# Patient Record
Sex: MALE | Race: WHITE | Employment: FULL TIME | URBAN - NONMETROPOLITAN AREA
[De-identification: names, ages, dates, MRNs, and addresses within clinical notes are randomized per-mention and may not be internally consistent; named-entity substitution may affect disease eponyms.]

---

## 2017-01-31 ENCOUNTER — TELEPHONE (OUTPATIENT)
Dept: FAMILY MEDICINE CLINIC | Age: 52
End: 2017-01-31

## 2017-05-15 ENCOUNTER — TELEPHONE (OUTPATIENT)
Dept: FAMILY MEDICINE CLINIC | Age: 52
End: 2017-05-15

## 2017-05-15 DIAGNOSIS — Z86.39 HISTORY OF HYPOTHYROIDISM: Chronic | ICD-10-CM

## 2017-05-15 DIAGNOSIS — R73.03 PREDIABETES: ICD-10-CM

## 2017-05-15 DIAGNOSIS — R73.9 HYPERGLYCEMIA: ICD-10-CM

## 2017-05-15 DIAGNOSIS — E66.9 OBESITY (BMI 30-39.9): Primary | Chronic | ICD-10-CM

## 2017-06-05 ENCOUNTER — TELEPHONE (OUTPATIENT)
Dept: UROLOGY | Age: 52
End: 2017-06-05

## 2017-06-05 ENCOUNTER — OFFICE VISIT (OUTPATIENT)
Dept: UROLOGY | Age: 52
End: 2017-06-05

## 2017-06-05 VITALS
WEIGHT: 285 LBS | SYSTOLIC BLOOD PRESSURE: 110 MMHG | HEIGHT: 73 IN | DIASTOLIC BLOOD PRESSURE: 68 MMHG | BODY MASS INDEX: 37.77 KG/M2

## 2017-06-05 DIAGNOSIS — N20.0 KIDNEY STONE: Primary | ICD-10-CM

## 2017-06-05 LAB
BILIRUBIN URINE: NEGATIVE
BLOOD URINE, POC: ABNORMAL
CHARACTER, URINE: CLEAR
COLOR, URINE: ABNORMAL
GLUCOSE URINE: NEGATIVE MG/DL
KETONES, URINE: NEGATIVE
LEUKOCYTE CLUMPS, URINE: NEGATIVE
NITRITE, URINE: NEGATIVE
PH, URINE: 5.5
PROTEIN, URINE: 30 MG/DL
SPECIFIC GRAVITY, URINE: 1.01 (ref 1–1.03)
UROBILINOGEN, URINE: 0.2 EU/DL

## 2017-06-05 PROCEDURE — 81003 URINALYSIS AUTO W/O SCOPE: CPT | Performed by: NURSE PRACTITIONER

## 2017-06-05 PROCEDURE — 99204 OFFICE O/P NEW MOD 45 MIN: CPT | Performed by: NURSE PRACTITIONER

## 2017-06-05 RX ORDER — KETOROLAC TROMETHAMINE 10 MG/1
10 TABLET, FILM COATED ORAL EVERY 6 HOURS PRN
Qty: 20 TABLET | Refills: 0 | Status: SHIPPED | OUTPATIENT
Start: 2017-06-05 | End: 2017-06-16 | Stop reason: SDUPTHER

## 2017-06-06 ENCOUNTER — TELEPHONE (OUTPATIENT)
Dept: UROLOGY | Age: 52
End: 2017-06-06

## 2017-06-08 ENCOUNTER — TELEPHONE (OUTPATIENT)
Dept: FAMILY MEDICINE CLINIC | Age: 52
End: 2017-06-08

## 2017-06-09 ENCOUNTER — TELEPHONE (OUTPATIENT)
Dept: UROLOGY | Age: 52
End: 2017-06-09

## 2017-06-10 ENCOUNTER — NURSE TRIAGE (OUTPATIENT)
Dept: ADMINISTRATIVE | Age: 52
End: 2017-06-10

## 2017-06-13 ENCOUNTER — TELEPHONE (OUTPATIENT)
Dept: UROLOGY | Age: 52
End: 2017-06-13

## 2017-06-13 RX ORDER — HYDROCODONE BITARTRATE AND ACETAMINOPHEN 5; 325 MG/1; MG/1
1 TABLET ORAL EVERY 4 HOURS PRN
Qty: 10 TABLET | Refills: 0 | Status: SHIPPED | OUTPATIENT
Start: 2017-06-13 | End: 2017-06-20

## 2017-06-15 ENCOUNTER — TELEPHONE (OUTPATIENT)
Dept: UROLOGY | Age: 52
End: 2017-06-15

## 2017-06-16 RX ORDER — KETOROLAC TROMETHAMINE 10 MG/1
10 TABLET, FILM COATED ORAL EVERY 6 HOURS PRN
Qty: 20 TABLET | Refills: 0 | Status: SHIPPED | OUTPATIENT
Start: 2017-06-16 | End: 2020-01-26

## 2017-06-19 ENCOUNTER — PROCEDURE VISIT (OUTPATIENT)
Dept: UROLOGY | Age: 52
End: 2017-06-19

## 2017-06-19 ENCOUNTER — TELEPHONE (OUTPATIENT)
Dept: UROLOGY | Age: 52
End: 2017-06-19

## 2017-06-19 VITALS
WEIGHT: 282 LBS | DIASTOLIC BLOOD PRESSURE: 82 MMHG | HEIGHT: 73 IN | SYSTOLIC BLOOD PRESSURE: 126 MMHG | BODY MASS INDEX: 37.37 KG/M2

## 2017-06-19 DIAGNOSIS — N20.0 RENAL STONE: ICD-10-CM

## 2017-06-19 DIAGNOSIS — N20.1 URETERAL STONE: Primary | ICD-10-CM

## 2017-06-19 LAB
BILIRUBIN URINE: NEGATIVE
BLOOD URINE, POC: ABNORMAL
CHARACTER, URINE: ABNORMAL
COLOR, URINE: ABNORMAL
GLUCOSE URINE: NEGATIVE MG/DL
KETONES, URINE: NEGATIVE
LEUKOCYTE CLUMPS, URINE: ABNORMAL
NITRITE, URINE: NEGATIVE
PH, URINE: 5.5
PROTEIN, URINE: 100 MG/DL
SPECIFIC GRAVITY, URINE: >= 1.03 (ref 1–1.03)
UROBILINOGEN, URINE: 0.2 EU/DL

## 2017-06-19 PROCEDURE — 52310 CYSTOSCOPY AND TREATMENT: CPT | Performed by: UROLOGY

## 2017-06-19 PROCEDURE — 81003 URINALYSIS AUTO W/O SCOPE: CPT | Performed by: UROLOGY

## 2017-06-19 PROCEDURE — 99999 PR OFFICE/OUTPT VISIT,PROCEDURE ONLY: CPT | Performed by: UROLOGY

## 2017-06-19 RX ORDER — OXYBUTYNIN CHLORIDE 5 MG/1
5 TABLET ORAL 3 TIMES DAILY PRN
Qty: 30 TABLET | Refills: 0 | Status: SHIPPED | OUTPATIENT
Start: 2017-06-19 | End: 2020-01-26

## 2017-08-10 LAB
ALBUMIN SERPL-MCNC: 4.7 G/DL (ref 3.2–5.3)
ALK PHOSPHATASE: 71 IU/L (ref 35–121)
ALT SERPL-CCNC: 22 IU/L (ref 5–59)
ANION GAP SERPL CALCULATED.3IONS-SCNC: 15 MMOL/L
AST SERPL-CCNC: 15 IU/L (ref 10–42)
AVERAGE GLUCOSE: 111 MG/DL (ref 66–114)
BILIRUB SERPL-MCNC: 0.6 MG/DL (ref 0.2–1.3)
BUN BLDV-MCNC: 12 MG/DL (ref 10–20)
CALCIUM SERPL-MCNC: 9.9 MG/DL (ref 8.7–10.8)
CHLORIDE BLD-SCNC: 103 MMOL/L (ref 95–111)
CHOLESTEROL/HDL RATIO: 6.3
CHOLESTEROL: 292 MG/DL
CO2: 26 MMOL/L (ref 21–32)
CREAT SERPL-MCNC: 1 MG/DL (ref 0.5–1.3)
EGFR AFRICAN AMERICAN: 96
EGFR IF NONAFRICAN AMERICAN: 79
GLUCOSE: 110 MG/DL (ref 70–100)
HBA1C MFR BLD: 5.5 % (ref 4.2–5.8)
HDLC SERPL-MCNC: 46 MG/DL (ref 40–60)
LDL CHOLESTEROL CALCULATED: 197 MG/DL
LDL/HDL RATIO: 4.3
POTASSIUM SERPL-SCNC: 4.5 MMOL/L (ref 3.5–5.4)
SODIUM BLD-SCNC: 139 MMOL/L (ref 134–147)
TOTAL PROTEIN: 7.1 G/DL (ref 5.8–8)
TRIGL SERPL-MCNC: 247 MG/DL
TSH SERPL DL<=0.05 MIU/L-ACNC: 1.25 UIU/ML (ref 0.4–4.4)
VLDLC SERPL CALC-MCNC: 49 MG/DL

## 2017-08-11 ENCOUNTER — TELEPHONE (OUTPATIENT)
Dept: FAMILY MEDICINE CLINIC | Age: 52
End: 2017-08-11

## 2017-08-16 ENCOUNTER — TELEPHONE (OUTPATIENT)
Dept: FAMILY MEDICINE CLINIC | Age: 52
End: 2017-08-16

## 2017-08-16 DIAGNOSIS — E78.2 MIXED HYPERLIPIDEMIA: Primary | Chronic | ICD-10-CM

## 2017-08-16 RX ORDER — EZETIMIBE AND SIMVASTATIN 10; 40 MG/1; MG/1
1 TABLET ORAL DAILY
COMMUNITY

## 2017-10-23 ENCOUNTER — TELEPHONE (OUTPATIENT)
Dept: FAMILY MEDICINE CLINIC | Age: 52
End: 2017-10-23

## 2017-10-23 NOTE — TELEPHONE ENCOUNTER
2nd attempt to contact the pt re:overdue labs Dr Saud Heller ordered on 8/16/17. HIPAA form is up to date, order mailed.

## 2020-01-26 ENCOUNTER — HOSPITAL ENCOUNTER (EMERGENCY)
Age: 55
Discharge: HOME | End: 2020-01-26
Attending: EMERGENCY MEDICINE
Payer: COMMERCIAL

## 2020-01-26 VITALS
HEART RATE: 88 BPM | HEIGHT: 72 IN | DIASTOLIC BLOOD PRESSURE: 78 MMHG | TEMPERATURE: 98 F | SYSTOLIC BLOOD PRESSURE: 100 MMHG | BODY MASS INDEX: 42.66 KG/M2 | RESPIRATION RATE: 18 BRPM | OXYGEN SATURATION: 99 % | WEIGHT: 315 LBS

## 2020-01-26 LAB
ALBUMIN SERPL-MCNC: 4.4 G/DL (ref 3.5–5.1)
ALP BLD-CCNC: 76 U/L (ref 38–126)
ALT SERPL-CCNC: 16 U/L (ref 11–66)
ANION GAP SERPL CALCULATED.3IONS-SCNC: 13 MEQ/L (ref 8–16)
AST SERPL-CCNC: 16 U/L (ref 5–40)
BASOPHILS # BLD: 0.7 %
BASOPHILS ABSOLUTE: 0.1 THOU/MM3 (ref 0–0.1)
BILIRUB SERPL-MCNC: 0.2 MG/DL (ref 0.3–1.2)
BUN BLDV-MCNC: 15 MG/DL (ref 7–22)
CALCIUM SERPL-MCNC: 9.3 MG/DL (ref 8.5–10.5)
CHLORIDE BLD-SCNC: 106 MEQ/L (ref 98–111)
CO2: 22 MEQ/L (ref 23–33)
CREAT SERPL-MCNC: 1 MG/DL (ref 0.4–1.2)
EKG ATRIAL RATE: 59 BPM
EKG P AXIS: 40 DEGREES
EKG P-R INTERVAL: 160 MS
EKG Q-T INTERVAL: 400 MS
EKG QRS DURATION: 104 MS
EKG QTC CALCULATION (BAZETT): 396 MS
EKG R AXIS: 25 DEGREES
EKG T AXIS: 31 DEGREES
EKG VENTRICULAR RATE: 59 BPM
EOSINOPHIL # BLD: 3 %
EOSINOPHILS ABSOLUTE: 0.3 THOU/MM3 (ref 0–0.4)
ERYTHROCYTE [DISTWIDTH] IN BLOOD BY AUTOMATED COUNT: 14.1 % (ref 11.5–14.5)
ERYTHROCYTE [DISTWIDTH] IN BLOOD BY AUTOMATED COUNT: 46.8 FL (ref 35–45)
GFR SERPL CREATININE-BSD FRML MDRD: 78 ML/MIN/1.73M2
GLUCOSE BLD-MCNC: 143 MG/DL (ref 70–108)
HCT VFR BLD CALC: 46.9 % (ref 42–52)
HEMOGLOBIN: 15.2 GM/DL (ref 14–18)
IMMATURE GRANS (ABS): 0.03 THOU/MM3 (ref 0–0.07)
IMMATURE GRANULOCYTES: 0.4 %
LYMPHOCYTES # BLD: 16.2 %
LYMPHOCYTES ABSOLUTE: 1.4 THOU/MM3 (ref 1–4.8)
MCH RBC QN AUTO: 29.5 PG (ref 26–33)
MCHC RBC AUTO-ENTMCNC: 32.4 GM/DL (ref 32.2–35.5)
MCV RBC AUTO: 90.9 FL (ref 80–94)
MONOCYTES # BLD: 6.4 %
MONOCYTES ABSOLUTE: 0.5 THOU/MM3 (ref 0.4–1.3)
NUCLEATED RED BLOOD CELLS: 0 /100 WBC
OSMOLALITY CALCULATION: 284.6 MOSMOL/KG (ref 275–300)
PLATELET # BLD: 151 THOU/MM3 (ref 130–400)
PMV BLD AUTO: 12.4 FL (ref 9.4–12.4)
POTASSIUM SERPL-SCNC: 4.1 MEQ/L (ref 3.5–5.2)
RBC # BLD: 5.16 MILL/MM3 (ref 4.7–6.1)
SCAN OF BLOOD SMEAR: NORMAL
SEG NEUTROPHILS: 73.3 %
SEGMENTED NEUTROPHILS ABSOLUTE COUNT: 6.2 THOU/MM3 (ref 1.8–7.7)
SODIUM BLD-SCNC: 141 MEQ/L (ref 135–145)
TOTAL PROTEIN: 7.2 G/DL (ref 6.1–8)
TROPONIN T: < 0.01 NG/ML
WBC # BLD: 8.4 THOU/MM3 (ref 4.8–10.8)

## 2020-01-26 PROCEDURE — 80053 COMPREHEN METABOLIC PANEL: CPT

## 2020-01-26 PROCEDURE — 36415 COLL VENOUS BLD VENIPUNCTURE: CPT

## 2020-01-26 PROCEDURE — 84484 ASSAY OF TROPONIN QUANT: CPT

## 2020-01-26 PROCEDURE — 2709999900 HC NON-CHARGEABLE SUPPLY

## 2020-01-26 PROCEDURE — 93005 ELECTROCARDIOGRAM TRACING: CPT | Performed by: EMERGENCY MEDICINE

## 2020-01-26 PROCEDURE — 99284 EMERGENCY DEPT VISIT MOD MDM: CPT

## 2020-01-26 PROCEDURE — 85025 COMPLETE CBC W/AUTO DIFF WBC: CPT

## 2020-01-26 RX ORDER — GUAIFENESIN AND CODEINE PHOSPHATE 100; 10 MG/5ML; MG/5ML
5 SOLUTION ORAL 3 TIMES DAILY PRN
Qty: 45 ML | Refills: 0 | Status: SHIPPED | OUTPATIENT
Start: 2020-01-26 | End: 2020-01-29

## 2020-01-26 ASSESSMENT — PAIN DESCRIPTION - ORIENTATION: ORIENTATION: LOWER

## 2020-01-26 ASSESSMENT — PAIN SCALES - GENERAL: PAINLEVEL_OUTOF10: 9

## 2020-01-26 ASSESSMENT — PAIN DESCRIPTION - LOCATION: LOCATION: ABDOMEN

## 2020-01-27 ASSESSMENT — ENCOUNTER SYMPTOMS
ABDOMINAL PAIN: 1
ABDOMINAL DISTENTION: 0
WHEEZING: 0
CONSTIPATION: 0
DIARRHEA: 0
SORE THROAT: 0
BACK PAIN: 0
EYE PAIN: 0
EYE DISCHARGE: 0
COUGH: 0
PHOTOPHOBIA: 0
VOMITING: 0
STRIDOR: 0
EYE REDNESS: 0
CHEST TIGHTNESS: 0
SHORTNESS OF BREATH: 0
NAUSEA: 0
EYE ITCHING: 0
RHINORRHEA: 0

## 2020-01-27 NOTE — ED TRIAGE NOTES
Pt comes in by private car. He began having intense lower abdominal pain about 2 hours ago. He states he is also nauseous but has not vomited. He has history of kidney stones but nothing like this. He is diaphoretic and states he feels like he is going to pass out.

## 2020-01-27 NOTE — ED PROVIDER NOTES
for environmental allergies and food allergies. Neurological: Negative for dizziness, tremors, syncope, weakness and headaches. Psychiatric/Behavioral: Negative for agitation, behavioral problems, confusion, self-injury, sleep disturbance and suicidal ideas. PAST MEDICAL HISTORY    has a past medical history of Depression, History of hypothyroidism, Hyperlipidemia, Obesity, and Obstructive sleep apnea. SURGICAL HISTORY      has a past surgical history that includes Anterior cruciate ligament repair (Right, 2004); Vasectomy (2012); and Cystocopy (Right, 06/07/2017). CURRENT MEDICATIONS       Discharge Medication List as of 1/26/2020  9:56 PM      CONTINUE these medications which have NOT CHANGED    Details   ezetimibe-simvastatin (VYTORIN) 10-40 MG per tablet Take 1 tablet by mouth nightlyHistorical Med             ALLERGIES     has No Known Allergies. FAMILY HISTORY     has no family status information on file. family history is not on file. SOCIAL HISTORY      reports that he has never smoked. He has never used smokeless tobacco. He reports current alcohol use. He reports that he does not use drugs. PHYSICAL EXAM     INITIAL VITALS:  height is 6' (1.829 m) and weight is 320 lb (145.2 kg) (abnormal). His oral temperature is 98 °F (36.7 °C). His blood pressure is 100/78 and his pulse is 88. His respiration is 18 and oxygen saturation is 99%. Physical Exam  Vitals signs and nursing note reviewed. Constitutional:       Appearance: He is well-developed. He is not diaphoretic. HENT:      Head: Normocephalic and atraumatic. Nose: Nose normal.   Eyes:      General: No scleral icterus. Right eye: No discharge. Left eye: No discharge. Conjunctiva/sclera: Conjunctivae normal.      Pupils: Pupils are equal, round, and reactive to light. Neck:      Musculoskeletal: Normal range of motion and neck supple. Vascular: No JVD. Trachea: No tracheal deviation. ms  QRS duration 104 ms   ms  Incomplete RBBB  No ST elevation or QT wave    RADIOLOGY: non-plain film images(s) such as CT, Ultrasound and MRI are read by the radiologist.    No orders to display       []Visualized and interpreted by me   [] Radiologist's Wet Read Report Reviewed   [] Discussed with Radiologist.    LABS:   Results for orders placed or performed during the hospital encounter of 01/26/20   CBC auto differential   Result Value Ref Range    WBC 8.4 4.8 - 10.8 thou/mm3    RBC 5.16 4.70 - 6.10 mill/mm3    Hemoglobin 15.2 14.0 - 18.0 gm/dl    Hematocrit 46.9 42.0 - 52.0 %    MCV 90.9 80.0 - 94.0 fL    MCH 29.5 26.0 - 33.0 pg    MCHC 32.4 32.2 - 35.5 gm/dl    RDW-CV 14.1 11.5 - 14.5 %    RDW-SD 46.8 (H) 35.0 - 45.0 fL    Platelets 660 565 - 109 thou/mm3    MPV 12.4 9.4 - 12.4 fL    Seg Neutrophils 73.3 %    Lymphocytes 16.2 %    Monocytes 6.4 %    Eosinophils 3.0 %    Basophils 0.7 %    Immature Granulocytes 0.4 %    Segs Absolute 6.2 1.8 - 7.7 thou/mm3    Lymphocytes Absolute 1.4 1.0 - 4.8 thou/mm3    Monocytes Absolute 0.5 0.4 - 1.3 thou/mm3    Eosinophils Absolute 0.3 0.0 - 0.4 thou/mm3    Basophils Absolute 0.1 0.0 - 0.1 thou/mm3    Immature Grans (Abs) 0.03 0.00 - 0.07 thou/mm3    nRBC 0 /100 wbc   Comprehensive Metabolic Panel   Result Value Ref Range    Glucose 143 (H) 70 - 108 mg/dL    CREATININE 1.0 0.4 - 1.2 mg/dL    BUN 15 7 - 22 mg/dL    Sodium 141 135 - 145 meq/L    Potassium 4.1 3.5 - 5.2 meq/L    Chloride 106 98 - 111 meq/L    CO2 22 (L) 23 - 33 meq/L    Calcium 9.3 8.5 - 10.5 mg/dL    AST 16 5 - 40 U/L    Alkaline Phosphatase 76 38 - 126 U/L    Total Protein 7.2 6.1 - 8.0 g/dL    Alb 4.4 3.5 - 5.1 g/dL    Total Bilirubin 0.2 (L) 0.3 - 1.2 mg/dL    ALT 16 11 - 66 U/L   Troponin   Result Value Ref Range    Troponin T < 0.010 ng/ml   Anion Gap   Result Value Ref Range    Anion Gap 13.0 8.0 - 16.0 meq/L   Glomerular Filtration Rate, Estimated   Result Value Ref Range    Est, Glom Filt Rate 78 (A) ml/min/1.73m2   Osmolality   Result Value Ref Range    Osmolality Calc 284.6 275.0 - 300 mOsmol/kg   Scan of Blood Smear   Result Value Ref Range    SCAN OF BLOOD SMEAR see below    EKG Syncope   Result Value Ref Range    Ventricular Rate 59 BPM    Atrial Rate 59 BPM    P-R Interval 160 ms    QRS Duration 104 ms    Q-T Interval 400 ms    QTc Calculation (Bazett) 396 ms    P Axis 40 degrees    R Axis 25 degrees    T Axis 31 degrees       EMERGENCY DEPARTMENT COURSE:   Vitals:    Vitals:    01/26/20 2046 01/26/20 2147   BP: 126/74 100/78   Pulse: 75 88   Resp: 18 18   Temp: 98 °F (36.7 °C)    TempSrc: Oral    SpO2: 98% 99%   Weight: (!) 320 lb (145.2 kg)    Height: 6' (1.829 m)        21:45    Patient is seen and evaluated in a timely fashion. Action:     STAT umbilical hernia reduction    MedicalDecision Making    Reassessment: feeling better. Umbilical hernia was reduced without difficulty, after reduction, patient has complete normal abdominal exam including no tenderness, no guarding, no rebound pain, patient has normal active bowel sounds, patient has no nausea vomiting. No suspicion patient has umbilical hernia strangulation or persistent incarceration causing SBO. Patient discharged with general surgery follow-up in 1 week. He has been having chronic cough in the last 3 months, I do feel a course of strong cough syrup is indicated with his umbilical hernia. He is given prescription of guaifenesin with codeine. CRITICAL CARE:   None    CONSULTS:  None    PROCEDURES:  None    FINAL IMPRESSION      1. Umbilical hernia without obstruction and without gangrene    2.  Viral URI with cough          DISPOSITION/PLAN   Home    PATIENT REFERRED TO:  MD Chris Swan [address]  [address]  [phone]    In 1 week  ED visit follow-up for umbilical hernia      DISCHARGE MEDICATIONS:  Discharge Medication List as of 1/26/2020  9:56 PM      START taking these medications    Details   guaiFENesin-codeine (TUSSI-ORGANIDIN NR) 100-10 MG/5ML syrup Take 5 mLs by mouth 3 times daily as needed for Cough for up to 3 days. , Disp-45 mL, R-0Print             (Please note that portions of this note were completed with a voice recognition program.  Efforts were made to edit the dictations but occasionally words aremis-transcribed.)    MD Ayala Arteaga MD  01/27/20 6249

## 2020-02-10 NOTE — PROGRESS NOTES
Maksim Boyce, [address] [place] [address]  [phone]  New Patient Evaluation in Office    Pt Name: Jono Lezama  Date of Birth 1965   Today's Date: 2/11/2020  Medical Record Number: [id number]  Referring Provider: No ref. provider found  Primary Care Provider: Abbey Givens MD  Chief Complaint   Patient presents with    Surgical Consult     new patient- [facility] ED 1/26-umbilical hernia     ASSESSMENT       Diagnosis Orders   1. Umbilical hernia without obstruction and without gangrene     2. Inguinal hernia, left       Past Medical History:   Diagnosis Date    Depression     History of hypothyroidism     no meds    Hyperlipidemia     Obesity 4/2/2014    Obstructive sleep apnea 02/13/2015    cpap    Umbilical hernia 4213          PLANS      1. Schedule Meagan Luevano for robotic left inguinal umbilical hernia repair possible mesh  2. The risks, options and alternatives for treatment were discussed in the office. We also discussed the use of mesh. Complications for the procedure were discussed including but not limited to infection, bleeding, recurrence, reoperation, injury to surrounding structures or organs. The patient questions were answered and has decided to proceed with hernia repair. 3. Status: outpatient  4. Planned anesthesia: general  5. He will undergo pre-operative clearance per anesthesia guidelines with risk factors listed under the past medical history diagnosis & problem list.     Trudy Bangura is a 50 y.o. male seen in the consultation for evaluation of an umbilical hernia. Symptoms were first noted several years ago and have gradually worsened since that time. The pain is dull, intermittent. Symptoms are made worse with Valsalva maneuvers and palliated by rest. The mass is not reducible. The patient denies any symptoms of signs or symptoms of incarceration or strangulation\".  He has not had prior Systems  Constitutional: Negative for activity change, appetite change, chills, diaphoresis, fatigue, fever and unexpected weight change. HENT: Negative for congestion, dental problem, drooling, ear discharge, ear pain, facial swelling, hearing loss, mouth sores, nosebleeds, postnasal drip, rhinorrhea, sinus pressure, sneezing, sore throat, tinnitus, trouble swallowing and voice change. Eyes: Negative for photophobia, pain, discharge, redness, itching and visual disturbance. Respiratory: Negative for apnea, cough, choking, chest tightness, shortness of breath, wheezing and stridor. Cardiovascular: Negative for chest pain, palpitations and leg swelling. Gastrointestinal: Negative for abdominal distention, abdominal pain, anal bleeding, blood in stool, constipation, diarrhea, nausea, rectal pain and vomiting. Endocrine: Negative for cold intolerance, heat intolerance, polydipsia, polyphagia and polyuria. Genitourinary: Negative for decreased urine volume, difficulty urinating, dysuria, enuresis, flank pain, frequency, genital sores, hematuria and urgency. Musculoskeletal: Negative for arthralgias, back pain, gait problem, joint swelling, myalgias, neck pain and neck stiffness. Umbilical hernia   Skin: Negative for color change, pallor, rash and wound. Allergic/Immunologic: Negative for environmental allergies, food allergies and immunocompromised state. Neurological: Negative for dizziness, tremors, seizures, syncope, facial asymmetry, speech difficulty, weakness, light-headedness, numbness and headaches. Hematological: Negative for adenopathy. Does not bruise/bleed easily. Psychiatric/Behavioral: Negative for agitation, behavioral problems, confusion, decreased concentration, dysphoric mood, hallucinations, self-injury, sleep disturbance and suicidal ideas. The patient is not nervous/anxious and is not hyperactive.     OBJECTIVE    VITALS:  height is 6' 1\" (1.854 m) and weight is 331 lb (150.1 kg) (abnormal). His temporal temperature is 96 °F (35.6 °C). His blood pressure is 138/62 and his pulse is 61. His respiration is 18 and oxygen saturation is 96%. Pain Score:   0 - No pain Body mass index is 43.67 kg/m². CONSTITUTIONAL: Alert and oriented times 3, no acute distress and cooperative to examination with proper mood and affect. SKIN: Skin color, texture, turgor normal. No rashes or lesions. LYMPH: no cervical nodes, no inguinal nodes  HEENT: Head is normocephalic, atraumatic. EOMI, PERRLA. NECK: Supple, symmetrical, trachea midline, no adenopathy, thyroid symmetric, not enlarged and no tenderness, skin normal.  CHEST/LUNGS: chest symmetric with normal A/P diameter, normal respiratory rate and rhythm, lungs clear to auscultation without wheezes, rales or rhonchi. No accessory muscle use. Scars None   CARDIOVASCULAR: Heart sounds are normal.  Regular rate and rhythm without murmur, gallop or rub. Normal S1 and S2. Carotid and femoral pulses 2+/4 and equal bilaterally. ABDOMEN: obese No scar(s) present. Normal bowel sounds. No bruits. soft, nontender, nondistended, no masses or organomegaly. direct umbilical hernia is present which is not reducible. Left inguinal hernia present and is reducible. Percussion: Normal without hepatosplenomegally. RECTAL: deferred, not clinically indicated  NEUROLOGIC: There are no focalizing motor or sensory deficits. CN II-XII are grossly intact. Darlean Cork EXTREMITIES: no cyanosis, no clubbing and no edema. Thank you for the interesting evaluation. Further recommendations as listed above.        Electronically signed by Levon Hill DO on 2/11/2020 at 1:55 PM

## 2020-02-11 ENCOUNTER — OFFICE VISIT (OUTPATIENT)
Dept: SURGERY | Age: 55
End: 2020-02-11
Payer: COMMERCIAL

## 2020-02-11 VITALS
DIASTOLIC BLOOD PRESSURE: 62 MMHG | HEART RATE: 61 BPM | TEMPERATURE: 96 F | SYSTOLIC BLOOD PRESSURE: 138 MMHG | OXYGEN SATURATION: 96 % | RESPIRATION RATE: 18 BRPM | WEIGHT: 315 LBS | BODY MASS INDEX: 41.75 KG/M2 | HEIGHT: 73 IN

## 2020-02-11 PROCEDURE — G8484 FLU IMMUNIZE NO ADMIN: HCPCS | Performed by: SURGERY

## 2020-02-11 PROCEDURE — G8417 CALC BMI ABV UP PARAM F/U: HCPCS | Performed by: SURGERY

## 2020-02-11 PROCEDURE — 99204 OFFICE O/P NEW MOD 45 MIN: CPT | Performed by: SURGERY

## 2020-02-11 PROCEDURE — G8427 DOCREV CUR MEDS BY ELIG CLIN: HCPCS | Performed by: SURGERY

## 2020-02-11 ASSESSMENT — ENCOUNTER SYMPTOMS
SINUS PRESSURE: 0
BLOOD IN STOOL: 0
CONSTIPATION: 0
BACK PAIN: 0
DIARRHEA: 0
ABDOMINAL DISTENTION: 0
STRIDOR: 0
EYE DISCHARGE: 0
EYE ITCHING: 0
RECTAL PAIN: 0
EYE REDNESS: 0
RHINORRHEA: 0
ANAL BLEEDING: 0
SHORTNESS OF BREATH: 0
FACIAL SWELLING: 0
VOICE CHANGE: 0
COLOR CHANGE: 0
PHOTOPHOBIA: 0
SORE THROAT: 0
ABDOMINAL PAIN: 0
CHOKING: 0
CHEST TIGHTNESS: 0
VOMITING: 0
COUGH: 0
NAUSEA: 0
EYE PAIN: 0
APNEA: 0
WHEEZING: 0
TROUBLE SWALLOWING: 0

## 2020-02-11 NOTE — PROGRESS NOTES
Subjective:      Patient ID: Jane Chin is a 50 y.o. male. Chief Complaint   Patient presents with    Surgical Consult     new patient- [address] [facility] ED 1/26-umbilical hernia       HPI    Review of Systems   Constitutional: Negative for activity change, appetite change, chills, diaphoresis, fatigue, fever and unexpected weight change. HENT: Negative for congestion, dental problem, drooling, ear discharge, ear pain, facial swelling, hearing loss, mouth sores, nosebleeds, postnasal drip, rhinorrhea, sinus pressure, sneezing, sore throat, tinnitus, trouble swallowing and voice change. Eyes: Negative for photophobia, pain, discharge, redness, itching and visual disturbance. Respiratory: Negative for apnea, cough, choking, chest tightness, shortness of breath, wheezing and stridor. Cardiovascular: Negative for chest pain, palpitations and leg swelling. Gastrointestinal: Negative for abdominal distention, abdominal pain, anal bleeding, blood in stool, constipation, diarrhea, nausea, rectal pain and vomiting. Endocrine: Negative for cold intolerance, heat intolerance, polydipsia, polyphagia and polyuria. Genitourinary: Negative for decreased urine volume, difficulty urinating, dysuria, enuresis, flank pain, frequency, genital sores, hematuria and urgency. Musculoskeletal: Negative for arthralgias, back pain, gait problem, joint swelling, myalgias, neck pain and neck stiffness. Umbilical hernia   Skin: Negative for color change, pallor, rash and wound. Allergic/Immunologic: Negative for environmental allergies, food allergies and immunocompromised state. Neurological: Negative for dizziness, tremors, seizures, syncope, facial asymmetry, speech difficulty, weakness, light-headedness, numbness and headaches. Hematological: Negative for adenopathy. Does not bruise/bleed easily.    Psychiatric/Behavioral: Negative for agitation, behavioral problems, confusion, decreased concentration, dysphoric mood, hallucinations, self-injury, sleep disturbance and suicidal ideas. The patient is not nervous/anxious and is not hyperactive.       Resp 18   Ht 6' 1\" (1.854 m)   Wt (!) 331 lb (150.1 kg)   BMI 43.67 kg/m²     Objective:   Physical Exam    Assessment:            Plan:              Rene Justice LPN

## 2020-02-11 NOTE — LETTER
Zenia Holter,   [address] [facility]. [address]  [place] [address]  [phone]     Pt Name: Sharona Olguin  Medical Record Number: [id number]  Date of Birth 1965   Today's Date: 2/11/2020    Susy Fung was seen in consultation in the office today. My assessment and plans are listed below. ASSESSMENT      Diagnosis Orders   1. Umbilical hernia without obstruction and without gangrene     2. Inguinal hernia, left       Past Medical History:   Diagnosis Date    Depression     History of hypothyroidism     no meds    Hyperlipidemia     Obesity 4/2/2014    Obstructive sleep apnea 02/13/2015    cpap    Umbilical hernia 3913         PLANS:   1. Schedule Krystal Bravo for robotic left inguinal umbilical hernia repair possible mesh  2. The risks, options and alternatives for treatment were discussed in the office. We also discussed the use of mesh. Complications for the procedure were discussed including but not limited to infection, bleeding, recurrence, reoperation, injury to surrounding structures or organs. The patient questions were answered and has decided to proceed with hernia repair. 3. Status: outpatient  4. Planned anesthesia: general  5. He will undergo pre-operative clearance per anesthesia guidelines with risk factors listed under the past medical history diagnosis & problem list.    If I can provide any additional assistance or you have any concerns, please feel free to contact me. Thank you for allowing to participate in the care of your patients. Sincerely,      Kenny Shah

## 2020-02-25 ENCOUNTER — TELEPHONE (OUTPATIENT)
Dept: SURGERY | Age: 55
End: 2020-02-25

## 2020-02-25 NOTE — TELEPHONE ENCOUNTER
Submitted request for OP surgery 2/26, pending reference # [id number]    [facility] ([facility]) as authorization is still pending, spoke with Bella Patel.  He states he will expedite to nurse reviewer and they will contact me today with determination.    Call reference # [id number]

## 2020-02-25 NOTE — TELEPHONE ENCOUNTER
Paul Espinoza called from [facility] and stated surgery 2/26 has been approved.    Auth # [id number]

## 2020-02-26 ENCOUNTER — ANESTHESIA EVENT (OUTPATIENT)
Dept: OPERATING ROOM | Age: 55
End: 2020-02-26
Payer: COMMERCIAL

## 2020-02-26 ENCOUNTER — ANESTHESIA (OUTPATIENT)
Dept: OPERATING ROOM | Age: 55
End: 2020-02-26
Payer: COMMERCIAL

## 2020-02-26 ENCOUNTER — HOSPITAL ENCOUNTER (OUTPATIENT)
Age: 55
Setting detail: OUTPATIENT SURGERY
Discharge: HOME | End: 2020-02-26
Attending: SURGERY | Admitting: SURGERY
Payer: COMMERCIAL

## 2020-02-26 VITALS
OXYGEN SATURATION: 93 % | HEIGHT: 73 IN | DIASTOLIC BLOOD PRESSURE: 57 MMHG | SYSTOLIC BLOOD PRESSURE: 107 MMHG | RESPIRATION RATE: 16 BRPM | HEART RATE: 90 BPM | TEMPERATURE: 98.1 F | BODY MASS INDEX: 41.75 KG/M2 | WEIGHT: 315 LBS

## 2020-02-26 VITALS — OXYGEN SATURATION: 99 % | SYSTOLIC BLOOD PRESSURE: 103 MMHG | TEMPERATURE: 97.4 F | DIASTOLIC BLOOD PRESSURE: 57 MMHG

## 2020-02-26 PROCEDURE — 6360000002 HC RX W HCPCS: Performed by: ANESTHESIOLOGY

## 2020-02-26 PROCEDURE — 7100000000 HC PACU RECOVERY - FIRST 15 MIN: Performed by: SURGERY

## 2020-02-26 PROCEDURE — 2500000003 HC RX 250 WO HCPCS: Performed by: SURGERY

## 2020-02-26 PROCEDURE — 7100000011 HC PHASE II RECOVERY - ADDTL 15 MIN: Performed by: SURGERY

## 2020-02-26 PROCEDURE — 7100000001 HC PACU RECOVERY - ADDTL 15 MIN: Performed by: SURGERY

## 2020-02-26 PROCEDURE — 2580000003 HC RX 258: Performed by: SURGERY

## 2020-02-26 PROCEDURE — 2500000003 HC RX 250 WO HCPCS: Performed by: NURSE ANESTHETIST, CERTIFIED REGISTERED

## 2020-02-26 PROCEDURE — C1781 MESH (IMPLANTABLE): HCPCS | Performed by: SURGERY

## 2020-02-26 PROCEDURE — 6360000002 HC RX W HCPCS: Performed by: NURSE ANESTHETIST, CERTIFIED REGISTERED

## 2020-02-26 PROCEDURE — 2709999900 HC NON-CHARGEABLE SUPPLY: Performed by: SURGERY

## 2020-02-26 PROCEDURE — S2900 ROBOTIC SURGICAL SYSTEM: HCPCS | Performed by: SURGERY

## 2020-02-26 PROCEDURE — 3600000009 HC SURGERY ROBOT BASE: Performed by: SURGERY

## 2020-02-26 PROCEDURE — 6370000000 HC RX 637 (ALT 250 FOR IP): Performed by: SURGERY

## 2020-02-26 PROCEDURE — 49650 LAP ING HERNIA REPAIR INIT: CPT | Performed by: SURGERY

## 2020-02-26 PROCEDURE — 7100000010 HC PHASE II RECOVERY - FIRST 15 MIN: Performed by: SURGERY

## 2020-02-26 PROCEDURE — 2720000010 HC SURG SUPPLY STERILE: Performed by: SURGERY

## 2020-02-26 PROCEDURE — 2580000003 HC RX 258: Performed by: NURSE ANESTHETIST, CERTIFIED REGISTERED

## 2020-02-26 PROCEDURE — 3700000001 HC ADD 15 MINUTES (ANESTHESIA): Performed by: SURGERY

## 2020-02-26 PROCEDURE — 49585 REPAIR UMBILICAL HERN,5+Y/O,REDUC: CPT | Performed by: SURGERY

## 2020-02-26 PROCEDURE — 3600000019 HC SURGERY ROBOT ADDTL 15MIN: Performed by: SURGERY

## 2020-02-26 PROCEDURE — 3700000000 HC ANESTHESIA ATTENDED CARE: Performed by: SURGERY

## 2020-02-26 DEVICE — MESH HERN M W8.5XL13.7CM L INGUINAL WHT POLYPR MFIL: Type: IMPLANTABLE DEVICE | Site: ABDOMEN | Status: FUNCTIONAL

## 2020-02-26 DEVICE — IMPLANTABLE DEVICE: Type: IMPLANTABLE DEVICE | Site: UMBILICAL | Status: FUNCTIONAL

## 2020-02-26 RX ORDER — NEOSTIGMINE METHYLSULFATE 5 MG/5 ML
SYRINGE (ML) INTRAVENOUS PRN
Status: DISCONTINUED | OUTPATIENT
Start: 2020-02-26 | End: 2020-02-26 | Stop reason: SDUPTHER

## 2020-02-26 RX ORDER — SODIUM CHLORIDE 0.9 % (FLUSH) 0.9 %
10 SYRINGE (ML) INJECTION PRN
Status: DISCONTINUED | OUTPATIENT
Start: 2020-02-26 | End: 2020-02-26 | Stop reason: HOSPADM

## 2020-02-26 RX ORDER — SODIUM CHLORIDE, SODIUM LACTATE, POTASSIUM CHLORIDE, CALCIUM CHLORIDE 600; 310; 30; 20 MG/100ML; MG/100ML; MG/100ML; MG/100ML
INJECTION, SOLUTION INTRAVENOUS CONTINUOUS PRN
Status: DISCONTINUED | OUTPATIENT
Start: 2020-02-26 | End: 2020-02-26 | Stop reason: SDUPTHER

## 2020-02-26 RX ORDER — SODIUM CHLORIDE 9 MG/ML
INJECTION, SOLUTION INTRAVENOUS CONTINUOUS
Status: DISCONTINUED | OUTPATIENT
Start: 2020-02-26 | End: 2020-02-26 | Stop reason: HOSPADM

## 2020-02-26 RX ORDER — BUPIVACAINE HYDROCHLORIDE 5 MG/ML
INJECTION, SOLUTION EPIDURAL; INTRACAUDAL PRN
Status: DISCONTINUED | OUTPATIENT
Start: 2020-02-26 | End: 2020-02-26 | Stop reason: ALTCHOICE

## 2020-02-26 RX ORDER — DEXAMETHASONE SODIUM PHOSPHATE 4 MG/ML
INJECTION, SOLUTION INTRA-ARTICULAR; INTRALESIONAL; INTRAMUSCULAR; INTRAVENOUS; SOFT TISSUE PRN
Status: DISCONTINUED | OUTPATIENT
Start: 2020-02-26 | End: 2020-02-26 | Stop reason: SDUPTHER

## 2020-02-26 RX ORDER — HYDROCODONE BITARTRATE AND ACETAMINOPHEN 5; 325 MG/1; MG/1
1 TABLET ORAL EVERY 4 HOURS PRN
Qty: 30 TABLET | Refills: 0 | Status: SHIPPED | OUTPATIENT
Start: 2020-02-26 | End: 2020-03-02

## 2020-02-26 RX ORDER — FENTANYL CITRATE 50 UG/ML
INJECTION, SOLUTION INTRAMUSCULAR; INTRAVENOUS PRN
Status: DISCONTINUED | OUTPATIENT
Start: 2020-02-26 | End: 2020-02-26 | Stop reason: SDUPTHER

## 2020-02-26 RX ORDER — PROMETHAZINE HYDROCHLORIDE 25 MG/ML
12.5 INJECTION, SOLUTION INTRAMUSCULAR; INTRAVENOUS
Status: DISCONTINUED | OUTPATIENT
Start: 2020-02-26 | End: 2020-02-26 | Stop reason: HOSPADM

## 2020-02-26 RX ORDER — PROPOFOL 10 MG/ML
INJECTION, EMULSION INTRAVENOUS PRN
Status: DISCONTINUED | OUTPATIENT
Start: 2020-02-26 | End: 2020-02-26 | Stop reason: SDUPTHER

## 2020-02-26 RX ORDER — MIDAZOLAM HYDROCHLORIDE 1 MG/ML
INJECTION INTRAMUSCULAR; INTRAVENOUS PRN
Status: DISCONTINUED | OUTPATIENT
Start: 2020-02-26 | End: 2020-02-26 | Stop reason: SDUPTHER

## 2020-02-26 RX ORDER — LABETALOL 20 MG/4 ML (5 MG/ML) INTRAVENOUS SYRINGE
10 EVERY 10 MIN PRN
Status: DISCONTINUED | OUTPATIENT
Start: 2020-02-26 | End: 2020-02-26 | Stop reason: HOSPADM

## 2020-02-26 RX ORDER — SUCCINYLCHOLINE/SOD CL,ISO/PF 200MG/10ML
SYRINGE (ML) INTRAVENOUS PRN
Status: DISCONTINUED | OUTPATIENT
Start: 2020-02-26 | End: 2020-02-26 | Stop reason: SDUPTHER

## 2020-02-26 RX ORDER — ONDANSETRON 2 MG/ML
INJECTION INTRAMUSCULAR; INTRAVENOUS PRN
Status: DISCONTINUED | OUTPATIENT
Start: 2020-02-26 | End: 2020-02-26 | Stop reason: SDUPTHER

## 2020-02-26 RX ORDER — CEFAZOLIN SODIUM 1 G/3ML
INJECTION, POWDER, FOR SOLUTION INTRAMUSCULAR; INTRAVENOUS PRN
Status: DISCONTINUED | OUTPATIENT
Start: 2020-02-26 | End: 2020-02-26 | Stop reason: SDUPTHER

## 2020-02-26 RX ORDER — HYDROCODONE BITARTRATE AND ACETAMINOPHEN 5; 325 MG/1; MG/1
1 TABLET ORAL EVERY 4 HOURS PRN
Status: DISCONTINUED | OUTPATIENT
Start: 2020-02-26 | End: 2020-02-26 | Stop reason: HOSPADM

## 2020-02-26 RX ORDER — ROCURONIUM BROMIDE 10 MG/ML
INJECTION, SOLUTION INTRAVENOUS PRN
Status: DISCONTINUED | OUTPATIENT
Start: 2020-02-26 | End: 2020-02-26 | Stop reason: SDUPTHER

## 2020-02-26 RX ORDER — SODIUM CHLORIDE 0.9 % (FLUSH) 0.9 %
10 SYRINGE (ML) INJECTION EVERY 12 HOURS SCHEDULED
Status: DISCONTINUED | OUTPATIENT
Start: 2020-02-26 | End: 2020-02-26 | Stop reason: HOSPADM

## 2020-02-26 RX ORDER — ONDANSETRON 2 MG/ML
4 INJECTION INTRAMUSCULAR; INTRAVENOUS EVERY 6 HOURS PRN
Status: DISCONTINUED | OUTPATIENT
Start: 2020-02-26 | End: 2020-02-26 | Stop reason: HOSPADM

## 2020-02-26 RX ORDER — GLYCOPYRROLATE 1 MG/5 ML
SYRINGE (ML) INTRAVENOUS PRN
Status: DISCONTINUED | OUTPATIENT
Start: 2020-02-26 | End: 2020-02-26 | Stop reason: SDUPTHER

## 2020-02-26 RX ORDER — FENTANYL CITRATE 50 UG/ML
50 INJECTION, SOLUTION INTRAMUSCULAR; INTRAVENOUS EVERY 5 MIN PRN
Status: DISCONTINUED | OUTPATIENT
Start: 2020-02-26 | End: 2020-02-26 | Stop reason: HOSPADM

## 2020-02-26 RX ADMIN — Medication 140 MG: at 08:42

## 2020-02-26 RX ADMIN — FENTANYL CITRATE 100 MCG: 50 INJECTION, SOLUTION INTRAMUSCULAR; INTRAVENOUS at 08:42

## 2020-02-26 RX ADMIN — SODIUM CHLORIDE, POTASSIUM CHLORIDE, SODIUM LACTATE AND CALCIUM CHLORIDE: 600; 310; 30; 20 INJECTION, SOLUTION INTRAVENOUS at 09:39

## 2020-02-26 RX ADMIN — ROCURONIUM BROMIDE 40 MG: 10 INJECTION INTRAVENOUS at 08:51

## 2020-02-26 RX ADMIN — Medication 3 MG: at 09:28

## 2020-02-26 RX ADMIN — PROPOFOL 180 MG: 10 INJECTION, EMULSION INTRAVENOUS at 08:42

## 2020-02-26 RX ADMIN — FENTANYL CITRATE 50 MCG: 50 INJECTION, SOLUTION INTRAMUSCULAR; INTRAVENOUS at 10:01

## 2020-02-26 RX ADMIN — Medication 0.6 MG: at 09:28

## 2020-02-26 RX ADMIN — Medication 2 MG: at 09:34

## 2020-02-26 RX ADMIN — SODIUM CHLORIDE: 9 INJECTION, SOLUTION INTRAVENOUS at 07:35

## 2020-02-26 RX ADMIN — FENTANYL CITRATE 50 MCG: 50 INJECTION, SOLUTION INTRAMUSCULAR; INTRAVENOUS at 10:14

## 2020-02-26 RX ADMIN — ONDANSETRON HYDROCHLORIDE 4 MG: 4 INJECTION, SOLUTION INTRAMUSCULAR; INTRAVENOUS at 09:27

## 2020-02-26 RX ADMIN — HYDROCODONE BITARTRATE AND ACETAMINOPHEN 1 TABLET: 5; 325 TABLET ORAL at 10:12

## 2020-02-26 RX ADMIN — Medication 0.4 MG: at 09:34

## 2020-02-26 RX ADMIN — CEFAZOLIN 3000 MG: 1 INJECTION, POWDER, FOR SOLUTION INTRAMUSCULAR; INTRAVENOUS; PARENTERAL at 08:58

## 2020-02-26 RX ADMIN — MIDAZOLAM HYDROCHLORIDE 2 MG: 1 INJECTION, SOLUTION INTRAMUSCULAR; INTRAVENOUS at 08:40

## 2020-02-26 RX ADMIN — FENTANYL CITRATE 50 MCG: 50 INJECTION, SOLUTION INTRAMUSCULAR; INTRAVENOUS at 09:09

## 2020-02-26 RX ADMIN — DEXAMETHASONE SODIUM PHOSPHATE 10 MG: 4 INJECTION, SOLUTION INTRAMUSCULAR; INTRAVENOUS at 08:49

## 2020-02-26 RX ADMIN — SODIUM CHLORIDE: 9 INJECTION, SOLUTION INTRAVENOUS at 08:36

## 2020-02-26 ASSESSMENT — PULMONARY FUNCTION TESTS
PIF_VALUE: 19
PIF_VALUE: 16
PIF_VALUE: 17
PIF_VALUE: 29
PIF_VALUE: 3
PIF_VALUE: 30
PIF_VALUE: 21
PIF_VALUE: 6
PIF_VALUE: 30
PIF_VALUE: 31
PIF_VALUE: 15
PIF_VALUE: 19
PIF_VALUE: 15
PIF_VALUE: 15
PIF_VALUE: 25
PIF_VALUE: 15
PIF_VALUE: 1
PIF_VALUE: 5
PIF_VALUE: 15
PIF_VALUE: 20
PIF_VALUE: 17
PIF_VALUE: 16
PIF_VALUE: 19
PIF_VALUE: 30
PIF_VALUE: 20
PIF_VALUE: 1
PIF_VALUE: 21
PIF_VALUE: 24
PIF_VALUE: 29
PIF_VALUE: 30
PIF_VALUE: 0
PIF_VALUE: 30
PIF_VALUE: 2
PIF_VALUE: 30
PIF_VALUE: 30
PIF_VALUE: 17
PIF_VALUE: 2
PIF_VALUE: 0
PIF_VALUE: 0
PIF_VALUE: 23
PIF_VALUE: 0
PIF_VALUE: 17
PIF_VALUE: 1
PIF_VALUE: 2
PIF_VALUE: 21
PIF_VALUE: 21
PIF_VALUE: 20
PIF_VALUE: 29
PIF_VALUE: 3
PIF_VALUE: 29
PIF_VALUE: 30
PIF_VALUE: 30
PIF_VALUE: 18
PIF_VALUE: 30
PIF_VALUE: 30
PIF_VALUE: 6
PIF_VALUE: 30
PIF_VALUE: 21
PIF_VALUE: 15
PIF_VALUE: 1
PIF_VALUE: 18
PIF_VALUE: 17
PIF_VALUE: 2
PIF_VALUE: 30
PIF_VALUE: 24
PIF_VALUE: 6
PIF_VALUE: 30
PIF_VALUE: 31
PIF_VALUE: 21
PIF_VALUE: 20
PIF_VALUE: 34
PIF_VALUE: 30

## 2020-02-26 ASSESSMENT — PAIN DESCRIPTION - DESCRIPTORS
DESCRIPTORS: ACHING

## 2020-02-26 ASSESSMENT — PAIN DESCRIPTION - INTENSITY
RATING_2: 6
RATING_2: 2
RATING_2: 8
RATING_2: 2

## 2020-02-26 ASSESSMENT — PAIN - FUNCTIONAL ASSESSMENT: PAIN_FUNCTIONAL_ASSESSMENT: 0-10

## 2020-02-26 ASSESSMENT — PAIN DESCRIPTION - FREQUENCY
FREQUENCY: CONTINUOUS

## 2020-02-26 ASSESSMENT — PAIN DESCRIPTION - ORIENTATION
ORIENTATION: LOWER
ORIENTATION: RIGHT

## 2020-02-26 ASSESSMENT — PAIN SCALES - GENERAL
PAINLEVEL_OUTOF10: 3
PAINLEVEL_OUTOF10: 5
PAINLEVEL_OUTOF10: 3
PAINLEVEL_OUTOF10: 8
PAINLEVEL_OUTOF10: 0
PAINLEVEL_OUTOF10: 5
PAINLEVEL_OUTOF10: 6
PAINLEVEL_OUTOF10: 7

## 2020-02-26 ASSESSMENT — PAIN DESCRIPTION - PAIN TYPE
TYPE: SURGICAL PAIN

## 2020-02-26 ASSESSMENT — PAIN DESCRIPTION - LOCATION
LOCATION_2: SHOULDER
LOCATION: ABDOMEN
LOCATION_2: SHOULDER
LOCATION: ABDOMEN
LOCATION_2: SHOULDER
LOCATION: ABDOMEN
LOCATION_2: SHOULDER
LOCATION: ABDOMEN
LOCATION: SHOULDER

## 2020-02-26 NOTE — PROGRESS NOTES
5986 To phase 1 recovery via cart. Report received from [facility]. Pt is arousing to name. Denies pain or nausea. IV intact and infusing. Skin is warm and dry. Respirations are easy & non-labored. Pt has a hx of sleep apnea per CRNA. O2 4L NC applied for O2 support. VS are WNL. Incision site-robotic x 2 right and left abdomen, umbilical site open incision per OR report. Robotic site has steri-strips noted to be intact with small amount red drainage noted. Reinforced with folded 2x2's and secured with tape. Umbilical site has steri-strips noted to be intact with small amount red drainage noted. Reinforced with folded 2x2's and secured with tape. SCD's applied and ice pack applied over incision sites. 0955 Pt resting quietly in bed. Skin is warm and dry. Respirations are easy & non-labored. A & O x 3. Pt states he is having incisional site discomfort and right shoulder pain related to gas used in OR.      1001 Fentanyl 50mcg IV push given for discomfort. 1006 Pt states pain is improving and is now 5/10 incision site 6/10 shoulder pain. Given ice chips per request.      1010 Pt continues to deny nausea. Discussed oral pain control in conjunction with IV pain control. Pt is agreeable. Given jello. 1012 Pt ate 100% of jello. Pt denies allergy to Hollie Caldwell 1 tab oral given per orders. 1014 Pt states incisional pain 5/10 & shoulder pain 7/10. Fentanyl 50mcg IV given. 1015 Repositioned in bed and HOB decreased per patient request.    1020 Pt states pain is better and drifts in and out of sleep. VS WNL. O2 support continues. Nurse at bedside and occasionally wakes patient to remind him to take deep breaths. Pt states, \"yea I have sleep apnea\". 1030 Pt continues to rest comfortably in bed. Skin is warm and dry. Respirations are easy & non-labored. VS WNL. [facility]. VS WNL. Skin is warm and dry. Respirations are easy & non-labored.     [address] [facility]

## 2020-02-26 NOTE — ANESTHESIA PRE PROCEDURE
Lithotripsy, Basket Retrieval of Stones, Stent Placement.  VASECTOMY  2012       Social History:    Social History     Tobacco Use    Smoking status: Never Smoker    Smokeless tobacco: Never Used   Substance Use Topics    Alcohol use: Yes     Comment: social                                Counseling given: Not Answered      Vital Signs (Current):   Vitals:    02/26/20 0728   BP: 124/75   Pulse: 92   Resp: 16   Temp: 96.9 °F (36.1 °C)   TempSrc: Temporal   SpO2: 96%   Weight: (!) 325 lb (147.4 kg)   Height: 6' 1\" (1.854 m)                                              BP Readings from Last 3 Encounters:   02/26/20 124/75   02/11/20 138/62   01/26/20 100/78       NPO Status: Time of last liquid consumption: 1900                        Time of last solid consumption: 1900                        Date of last liquid consumption: 02/25/20                        Date of last solid food consumption: 02/25/20    BMI:   Wt Readings from Last 3 Encounters:   02/26/20 (!) 325 lb (147.4 kg)   02/11/20 (!) 331 lb (150.1 kg)   01/26/20 (!) 320 lb (145.2 kg)     Body mass index is 42.88 kg/m². CBC:   Lab Results   Component Value Date    WBC 8.4 01/26/2020    RBC 5.16 01/26/2020    RBC 4.96 05/27/2016    HGB 15.2 01/26/2020    HCT 46.9 01/26/2020    MCV 90.9 01/26/2020    RDW 14.2 06/04/2017     01/26/2020       CMP:   Lab Results   Component Value Date     01/26/2020    K 4.1 01/26/2020     01/26/2020    CO2 22 01/26/2020    BUN 15 01/26/2020    CREATININE 1.0 01/26/2020    LABGLOM 78 01/26/2020    GLUCOSE 143 01/26/2020    GLUCOSE 110 08/09/2017    PROT 7.2 01/26/2020    CALCIUM 9.3 01/26/2020    BILITOT 0.2 01/26/2020    ALKPHOS 76 01/26/2020    AST 16 01/26/2020    ALT 16 01/26/2020       POC Tests: No results for input(s): POCGLU, POCNA, POCK, POCCL, POCBUN, POCHEMO, POCHCT in the last 72 hours.     Coags: No results found for: PROTIME, INR, APTT    HCG (If Applicable): No results found for:

## 2020-02-26 NOTE — BRIEF OP NOTE
Brief Postoperative Note  ______________________________________________________________    Patient: Estefania Dillon  YOB: 1965  MRN: [id number]   Date of Procedure: 2/26/2020    Pre-Op Diagnosis: LEFT INGUINAL HERNIA, UMBILICAL HERNIA    Post-Op Diagnosis: Same       Procedure(s):   ROBOTIC LEFT INGUINAL HERNIA REPAIR WITH MESH, UMBILICAL HERNIA REPAIR WITH MESH    Anesthesia: General    Surgeon(s):  Jack Boyce DO    Complications: None    Norma Reece DO  Date: 2/26/2020  Time: 10:05 AM

## 2020-02-26 NOTE — H&P
Joe Pelaez. [facility], [address] [place] [address]  [phone]  New Patient Evaluation in Office    Pt Name: Rashmi Sahni  Date of Birth 1965   Today's Date: 2/11/2020  Medical Record Number: [id number]  Referring Provider: No ref. provider found  Primary Care Provider: Dilma Arreaga MD  Chief Complaint   Patient presents with    Surgical Consult     new patient- [facility] ED 1/26-umbilical hernia     ASSESSMENT       Diagnosis Orders   1. Umbilical hernia without obstruction and without gangrene     2. Inguinal hernia, left       Past Medical History:   Diagnosis Date    Depression     History of hypothyroidism     no meds    Hyperlipidemia     Obesity 4/2/2014    Obstructive sleep apnea 02/13/2015    cpap    Umbilical hernia 8532          PLANS      1. Schedule Cherylene Setters for robotic left inguinal umbilical hernia repair possible mesh  2. The risks, options and alternatives for treatment were discussed in the office. We also discussed the use of mesh. Complications for the procedure were discussed including but not limited to infection, bleeding, recurrence, reoperation, injury to surrounding structures or organs. The patient questions were answered and has decided to proceed with hernia repair. 3. Status: outpatient  4. Planned anesthesia: general  5. He will undergo pre-operative clearance per anesthesia guidelines with risk factors listed under the past medical history diagnosis & problem list.     Luz Garcia is a 50 y.o. male seen in the consultation for evaluation of an umbilical hernia. Symptoms were first noted several years ago and have gradually worsened since that time. The pain is dull, intermittent. Symptoms are made worse with Valsalva maneuvers and palliated by rest. The mass is not reducible. The patient denies any symptoms of signs or symptoms of incarceration or strangulation\".  He has not had prior Systems  Constitutional: Negative for activity change, appetite change, chills, diaphoresis, fatigue, fever and unexpected weight change. HENT: Negative for congestion, dental problem, drooling, ear discharge, ear pain, facial swelling, hearing loss, mouth sores, nosebleeds, postnasal drip, rhinorrhea, sinus pressure, sneezing, sore throat, tinnitus, trouble swallowing and voice change. Eyes: Negative for photophobia, pain, discharge, redness, itching and visual disturbance. Respiratory: Negative for apnea, cough, choking, chest tightness, shortness of breath, wheezing and stridor. Cardiovascular: Negative for chest pain, palpitations and leg swelling. Gastrointestinal: Negative for abdominal distention, abdominal pain, anal bleeding, blood in stool, constipation, diarrhea, nausea, rectal pain and vomiting. Endocrine: Negative for cold intolerance, heat intolerance, polydipsia, polyphagia and polyuria. Genitourinary: Negative for decreased urine volume, difficulty urinating, dysuria, enuresis, flank pain, frequency, genital sores, hematuria and urgency. Musculoskeletal: Negative for arthralgias, back pain, gait problem, joint swelling, myalgias, neck pain and neck stiffness. Umbilical hernia   Skin: Negative for color change, pallor, rash and wound. Allergic/Immunologic: Negative for environmental allergies, food allergies and immunocompromised state. Neurological: Negative for dizziness, tremors, seizures, syncope, facial asymmetry, speech difficulty, weakness, light-headedness, numbness and headaches. Hematological: Negative for adenopathy. Does not bruise/bleed easily. Psychiatric/Behavioral: Negative for agitation, behavioral problems, confusion, decreased concentration, dysphoric mood, hallucinations, self-injury, sleep disturbance and suicidal ideas. The patient is not nervous/anxious and is not hyperactive.     OBJECTIVE    VITALS:  height is 6' 1\" (1.854 m) and weight is 331 plans.         Electronically signed by Kalina Hung DO on 2/26/2020 at 8:03 AM

## 2020-02-27 ENCOUNTER — TELEPHONE (OUTPATIENT)
Dept: SURGERY | Age: 55
End: 2020-02-27

## 2020-02-28 NOTE — OP NOTE
anterior  abdominal wall was prepped and draped in a sterile fashion. A  curvilinear incision made along the superior border of the umbilicus and  carried down to subcutaneous tissue. Subcutaneous tissue dissection  carried out with electrocautery elevating the umbilicus out to the  underlying herniated contents. Herniated contents were able to be  reduced back in the abdomen and a 10 mm Optiview port was inserted  through the hernia defect into the abdomen and insufflation then created  to 17 mmHg. Visual inspection of the abdomen was then carried out. Please see operative findings above for specifics. The patient was then placed in Trendelenburg position and two additional  robotic ports placed in the left upper quadrant and right upper quadrant  respectively. The robot was then brought in and subsequently docked and  instrumentation placed under direct visualization. The peritoneum was  scored using scissors and the preperitoneal plane was dissected down to  the back side of the pubic bone with care not to injure the bladder. The herniated contents were reduced out of the direct defect. There was  no indirect defect evident. Once it was adequately reduced, a medium  size Bard 3D mesh was then placed in the area, secured to the pubic bone  superiorly and across laterally using a V-Loc suture. Adequate  hemostasis was appreciated. An additional V-Loc suture was used to  close the peritoneum. After this had been completed, the robotic ports were all subsequently  removed. The defect of the umbilicus was grasped and elevated with  Kocher forceps and a small Ventralex placed within the defect and  secured in place using 0 Prolene suture. Good approximation was  appreciated. No additional pathology identified. The umbilicus  reattached to the fascia using 2-0 Vicryl suture.   Deep tissues were  approximated using 3-0 Vicryl suture and all the skin incisions were  closed using 4-0 Vicryl suture in combination of single interrupted and  running subcuticular fashion. Wound was then cleansed, prepared with  Mastisol. Steri-Strips and sterile dressings were applied. The patient  was brought out of anesthesia and transferred to PACU in stable and  satisfactory condition. No immediate complications evident. All  sponge, instrument, and needle counts were correct at the completion of  the procedure. Postoperative findings were discussed with the patient's family. He was  given discharge instructions, prescriptions for analgesics and will  follow up in my office in two-week period of time for evaluation.         Cesar Parker D.O.    D: 02/27/2020 13:22:44       T: 02/28/2020 0:05:24     [email or web address]  Job#: [id number]     Doc#: [id number]    CC:

## 2020-09-01 ENCOUNTER — HOSPITAL ENCOUNTER (EMERGENCY)
Age: 55
Discharge: HOME | End: 2020-09-01
Payer: COMMERCIAL

## 2020-09-01 VITALS
HEART RATE: 86 BPM | SYSTOLIC BLOOD PRESSURE: 120 MMHG | BODY MASS INDEX: 39.76 KG/M2 | OXYGEN SATURATION: 95 % | DIASTOLIC BLOOD PRESSURE: 67 MMHG | HEIGHT: 73 IN | TEMPERATURE: 97.6 F | RESPIRATION RATE: 18 BRPM | WEIGHT: 300 LBS

## 2020-09-01 PROCEDURE — 99213 OFFICE O/P EST LOW 20 MIN: CPT | Performed by: NURSE PRACTITIONER

## 2020-09-01 PROCEDURE — 99212 OFFICE O/P EST SF 10 MIN: CPT

## 2020-09-01 RX ORDER — PREDNISONE 10 MG/1
TABLET ORAL
Qty: 30 TABLET | Refills: 0 | Status: SHIPPED | OUTPATIENT
Start: 2020-09-01 | End: 2021-03-16

## 2020-09-01 ASSESSMENT — ENCOUNTER SYMPTOMS
EYE REDNESS: 0
SORE THROAT: 0
DIARRHEA: 0
VOMITING: 0
TROUBLE SWALLOWING: 0
RHINORRHEA: 0
EYE DISCHARGE: 0
COUGH: 0
SHORTNESS OF BREATH: 0
NAUSEA: 0

## 2020-09-01 NOTE — ED PROVIDER NOTES
[id number]       Chief Complaint   Patient presents with    Rash       Nurses Notes reviewed and I agree except as noted in the HPI. HISTORY OF PRESENT ILLNESS   Joshua Lara is a 50 y.o. male who presents with complaints of rash. Onset of symptoms less than 3 days ago, worsening. Rash present over several places on his body. Associated pruritus. Patient exposed to poison ivy. No improvement with over-the-counter treatment. REVIEW OF SYSTEMS     Review of Systems   Constitutional: Negative for chills, diaphoresis, fatigue and fever. HENT: Negative for congestion, ear pain, rhinorrhea, sore throat and trouble swallowing. Eyes: Negative for discharge and redness. Respiratory: Negative for cough and shortness of breath. Cardiovascular: Negative for chest pain. Gastrointestinal: Negative for diarrhea, nausea and vomiting. Genitourinary: Negative for decreased urine volume. Musculoskeletal: Negative for neck pain and neck stiffness. Skin: Positive for rash. Neurological: Negative for headaches. Hematological: Negative for adenopathy. Psychiatric/Behavioral: Negative for sleep disturbance. PAST MEDICAL HISTORY         Diagnosis Date    Depression     History of hypothyroidism     no meds    Hyperlipidemia     Obesity 4/2/2014    Obstructive sleep apnea 02/13/2015    cpap    Umbilical hernia 1525       SURGICAL HISTORY     Patient  has a past surgical history that includes Anterior cruciate ligament repair (Right, 2004); Vasectomy (2012); Cystocopy (Right, 06/07/2017); Colonoscopy (2018); and hernia repair (Left, 2/26/2020).     CURRENT MEDICATIONS       Discharge Medication List as of 9/1/2020  7:05 PM      CONTINUE these medications which have NOT CHANGED    Details   ezetimibe-simvastatin (VYTORIN) 10-40 MG per tablet Take 1 tablet by mouth daily Historical Med             ALLERGIES     Patient is has Discharge 09/01/2020 07:05:08 PM  Rash consistent with poison ivy dermatitis, no secondary infection. Medication as prescribed, continue current medicine. If symptoms worsen return or go to ER. PATIENT REFERRED TO:  Orestes Mckeon MD  [address] [place] [address]  [phone]    In 2 weeks  Follow up as needed. Medication as prescribed, take with food. Continue current treatment. If worse go to ER.     DISCHARGE MEDICATIONS:  Discharge Medication List as of 9/1/2020  7:05 PM      START taking these medications    Details   predniSONE (DELTASONE) 10 MG tablet Take 4 tablets by mouth days 1-3, 3 tablets days 4-6, 2 tablets days 7-9, 1 tablet days 10-12., Disp-30 tablet,R-0Normal           Discharge Medication List as of 9/1/2020  7:05 PM          [address], APRN - CNP  09/01/20 1914

## 2020-09-01 NOTE — ED NOTES
Pt discharged. Pt verbalized understanding of discharge instructions and script. Pt walked per self. Pt in stable condition.      Jamaal Beaulieu LPN  [id number] [phone]

## 2020-09-01 NOTE — ED TRIAGE NOTES
Patient states he was out in his yard on Saturday and began to break out with rash on Sunday. Rash has spread to both arms and legs.

## 2021-03-16 ENCOUNTER — INITIAL CONSULT (OUTPATIENT)
Dept: PULMONOLOGY | Age: 56
End: 2021-03-16
Payer: COMMERCIAL

## 2021-03-16 VITALS
SYSTOLIC BLOOD PRESSURE: 122 MMHG | TEMPERATURE: 97 F | HEIGHT: 73 IN | HEART RATE: 109 BPM | WEIGHT: 315 LBS | DIASTOLIC BLOOD PRESSURE: 76 MMHG | OXYGEN SATURATION: 98 % | BODY MASS INDEX: 41.75 KG/M2

## 2021-03-16 DIAGNOSIS — G47.33 OBSTRUCTIVE SLEEP APNEA: Primary | ICD-10-CM

## 2021-03-16 DIAGNOSIS — G47.10 HYPERSOMNIA: ICD-10-CM

## 2021-03-16 DIAGNOSIS — G47.09 OTHER INSOMNIA: ICD-10-CM

## 2021-03-16 DIAGNOSIS — E66.01 MORBID OBESITY WITH BMI OF 45.0-49.9, ADULT (HCC): ICD-10-CM

## 2021-03-16 DIAGNOSIS — R06.83 SNORING: ICD-10-CM

## 2021-03-16 PROCEDURE — G8484 FLU IMMUNIZE NO ADMIN: HCPCS | Performed by: PHYSICIAN ASSISTANT

## 2021-03-16 PROCEDURE — 1036F TOBACCO NON-USER: CPT | Performed by: PHYSICIAN ASSISTANT

## 2021-03-16 PROCEDURE — 3017F COLORECTAL CA SCREEN DOC REV: CPT | Performed by: PHYSICIAN ASSISTANT

## 2021-03-16 PROCEDURE — G8427 DOCREV CUR MEDS BY ELIG CLIN: HCPCS | Performed by: PHYSICIAN ASSISTANT

## 2021-03-16 PROCEDURE — 99204 OFFICE O/P NEW MOD 45 MIN: CPT | Performed by: PHYSICIAN ASSISTANT

## 2021-03-16 PROCEDURE — G8417 CALC BMI ABV UP PARAM F/U: HCPCS | Performed by: PHYSICIAN ASSISTANT

## 2021-03-16 RX ORDER — PHENTERMINE HYDROCHLORIDE 37.5 MG/1
TABLET ORAL
COMMUNITY
Start: 2021-02-23

## 2021-03-16 ASSESSMENT — ENCOUNTER SYMPTOMS
EYES NEGATIVE: 1
ALLERGIC/IMMUNOLOGIC NEGATIVE: 1
NAUSEA: 0
DIARRHEA: 0
STRIDOR: 0
BACK PAIN: 0
WHEEZING: 0
CHEST TIGHTNESS: 0
COUGH: 0
SHORTNESS OF BREATH: 0

## 2021-03-16 NOTE — PROGRESS NOTES
[facility]    Patient: Linda Humphreys, 49 y.o.   : 1965  3/16/2021    Pt of      Subjective     Chief Complaint   Patient presents with    New Patient     Sleep consult ref by Tab Fernandez for snoring. No download         HPI  Carline French is here for JENNY. Symptoms include snoring, periods of not breathing, excessive daytime sleepiness, falling asleep while at work, driving, reading, watching television, awakening in the middle of the night, feels sleepy during the day. He was diagnosed in  with sever JENNY. He was started on PAP and wore PAP for a few months and then stopped wearing it. He was seen again in  and tried PAP again after titration. He wore PAP for a few months and then stopped again. He was still snoring and not sleeping so he stopped wearing it. He is tired all the time. He has a CPAP at home from  that still works and is set to pressure of 11. Not sure if auto capable       Sleep Hx   SLEEP HISTORY    Sleep Symptoms  This has been evaluated or treated before. Sleep related complaints include snoring, excessive daytime sleepiness, feels sleepy during the day as well. Carline French denies any history of seizures, sleep walking or talking and there is no history suggestive of bruxism or restless leg syndrome. Bedtime Narative  He goes to bed at 930-10pm and w[facility] up at [address] [facility] that this is  different on the weekends. Most of the time, it takes him less than 5min to fall asleep with difficulty falling back to sleep if he awakens, usually because he has to go to the bathroom. Nap History  Carline French does take naps very often. If he does, they are helpful. Snoring History  Shana snore or has been told he snores. There have been witnessed apneas. He does awakenwith choking or gasping.     Dreaming   Carline French does not have recurring dreams, and specifically does not have episodes of feeling paralyzed while awake, or anything to suggest cataplexy or dreams he would describe as hallucinations. Driving History  Eduard Moore does report sleepiness while driving. There have not been driving accidents or near-misses related to sleepiness, fatigue or inattention. Weight Issues  There has been a change in his weight of about 70lbs in the past 6 years- since previous titration     Caffeine Intake  Eduard Moore does drink caffeine, usually about  2 sodas per day. Employment History  Eduard Moore works for [employer]. The work hours are generally first shift and there hasbeen any recent changes in the shifts or hours. Download     PAP Download:   Original or initial  AHI: 87.2     Date of initial study: 10/28/2008      Neck Size: 18.75  Mallampati Mallampati 4  ESS:  21  SAQLI- 34    Past Medical hx     PMH:  Past Medical History:   Diagnosis Date    Depression     History of hypothyroidism     no meds    Hyperlipidemia     Obesity 4/2/2014    Obstructive sleep apnea 02/13/2015    cpap    Umbilical hernia 4305     SURGICAL HISTORY:  Past Surgical History:   Procedure Laterality Date    ANTERIOR CRUCIATE LIGAMENT REPAIR Right 2004    [facility]  [facility]    [facility]    CYSTOSCOPY Right 06/07/2017    Right Ureteroscopy, Laser Lithotripsy, Basket Retrieval of Stones, Stent Placement.     HERNIA REPAIR Left 2/26/2020    ROBOTIC LEFT INGUINAL HERNIA REPAIR WITH MESH, UMBILICAL HERNIA REPAIR POSS MESH performed by Herminia Bustillo DO at [address]  2012     SOCIAL HISTORY:  Social History     Tobacco Use    Smoking status: Never Smoker    Smokeless tobacco: Never Used   Substance Use Topics    Alcohol use: Yes     Comment: social    Drug use: No     ALLERGIES:No Known Allergies  FAMILY HISTORY:  Family History   Problem Relation Age of Onset    Anemia Mother         sees Dr Bright Gordon    No Known Problems Father     Emphysema Maternal Grandmother     No Known Problems Maternal Grandfather     Alzheimer's Disease Paternal Grandmother     Alzheimer's Disease Paternal Grandfather     Colon Cancer Neg Hx     Prostate Cancer Neg Hx      CURRENT MEDICATIONS:  Current Outpatient Medications   Medication Sig Dispense Refill    phentermine (ADIPEX-P) 37.5 MG tablet       ezetimibe-simvastatin (VYTORIN) 10-40 MG per tablet Take 1 tablet by mouth daily Indications: 20 mg        No current facility-administered medications for this visit. Lysbeth Carrel ROS   Review of Systems   Constitutional: Negative for activity change, appetite change, chills and fever. HENT: Negative for congestion and postnasal drip. Eyes: Negative. Respiratory: Negative for cough, chest tightness, shortness of breath, wheezing and stridor. Cardiovascular: Negative for chest pain and leg swelling. Gastrointestinal: Negative for diarrhea and nausea. Endocrine: Negative. Genitourinary: Negative. Musculoskeletal: Negative. Negative for arthralgias and back pain. Skin: Negative. Allergic/Immunologic: Negative. Neurological: Negative. Negative for dizziness and light-headedness. Psychiatric/Behavioral: Negative. All other systems reviewed and are negative. Physical exam   /76 (Site: Left Upper Arm, Position: Sitting)   Pulse 109   Temp 97 °F (36.1 °C)   Ht 6' 1\" (1.854 m)   Wt (!) 343 lb 3.2 oz (155.7 kg)   SpO2 98% Comment: on RA  BMI 45.28 kg/m²      Physical Exam  Constitutional:       Appearance: He is well-developed. HENT:      Head: Normocephalic and atraumatic. Right Ear: External ear normal.      Left Ear: External ear normal.   Eyes:      Conjunctiva/sclera: Conjunctivae normal.      Pupils: Pupils are equal, round, and reactive to light. Neck:      Musculoskeletal: Normal range of motion and neck supple. Cardiovascular:      Rate and Rhythm: Normal rate and regular rhythm. Heart sounds: Normal heart sounds.    Pulmonary:      Effort: Pulmonary effort is normal.      Breath sounds: Normal breath sounds. Musculoskeletal: Normal range of motion. Skin:     General: Skin is warm and dry. Neurological:      Mental Status: He is alert and oriented to person, place, and time. Psychiatric:         Behavior: Behavior normal.         Thought Content: Thought content normal.         Judgment: Judgment normal.          Sleep Study     Assessment      Diagnosis Orders   1. Obstructive sleep apnea     2. Hypersomnia     3. Other insomnia     4. Morbid obesity with BMI of 45.0-49.9, adult ([facility] [address]          Plan   - Will send for re-titration given significant weight gain since previous titration  - Will treat JENNY and see if improves insomnia  - Will watch to ensure snoring resolves- may also have upper air way issue  - Hypersomnia will improve as JENNY is treated and sleep improves   - Once titration done, His current PAP can be set to that pressure unless titrated to bipap  - Recommend 7-9 hours of sleep with PAP  - he was advised to call [facility] regarding supplies if needed. - he call my office for earlier appointment if needed for worseningof sleep symptoms.   - he was instructed on weight loss  - Aston Mcneill  was educated about my impression and plan. Patient verbalizes understanding.   We will see Aston Mcneill  back in 6-8 weeks    Yamilet Sandoval PA-C, [place]  3/16/2021

## 2021-04-12 ENCOUNTER — TELEPHONE (OUTPATIENT)
Dept: SLEEP CENTER | Age: 56
End: 2021-04-12

## 2021-04-12 DIAGNOSIS — G47.33 OBSTRUCTIVE SLEEP APNEA: Primary | ICD-10-CM

## 2021-04-12 NOTE — TELEPHONE ENCOUNTER
[facility] has denied the in lab titration study due to not being medically necessary. If you do not agree with this determination, a peer to peer can be completed by calling [phone] within 21 calendar days, refer to member ID# [id number]. Please advise. Thank you.     Branden Tristan

## 2021-05-17 DIAGNOSIS — G47.33 OBSTRUCTIVE SLEEP APNEA: Primary | ICD-10-CM

## 2021-05-17 PROCEDURE — 99091 COLLJ & INTERPJ DATA EA 30 D: CPT | Performed by: PHYSICIAN ASSISTANT

## 2021-05-17 NOTE — PROGRESS NOTES
5/17/2021   PAP Download: Compliant  100%     Noncompliant 0 %     PAP Type CPAP Level  8-20   Avg Hrs/Day 4hr  AHI: 15     Machine/Mfg:   [x] ResMed    [] Respironics/Dreamstation     Review of [facility] download reveals AHI is elevated with mostly obstructive events. Based on review of download, will increase min pressure  Download and case was reviewed for 30min    The encounter diagnosis was Obstructive sleep apnea.           Toby Miles PA-C, PETRAS  5/17/2021

## 2021-06-01 ENCOUNTER — TELEPHONE (OUTPATIENT)
Dept: PULMONOLOGY | Age: 56
End: 2021-06-01

## 2021-06-01 NOTE — TELEPHONE ENCOUNTER
Patient calling in because his pap machine is very loud. He states he was unable to take it with him on his business trip due to the noise. Please see attached download.

## 2021-06-02 NOTE — TELEPHONE ENCOUNTER
He needs to take it DME to have it looked at. I placed an order in March for a new PAP. Not sure why he does not have a new one unless he is not eligible.

## 2021-06-09 ENCOUNTER — OFFICE VISIT (OUTPATIENT)
Dept: PULMONOLOGY | Age: 56
End: 2021-06-09
Payer: COMMERCIAL

## 2021-06-09 VITALS
TEMPERATURE: 98 F | HEIGHT: 73 IN | WEIGHT: 315 LBS | OXYGEN SATURATION: 98 % | BODY MASS INDEX: 41.75 KG/M2 | HEART RATE: 83 BPM | DIASTOLIC BLOOD PRESSURE: 80 MMHG | SYSTOLIC BLOOD PRESSURE: 138 MMHG

## 2021-06-09 DIAGNOSIS — E66.01 MORBID OBESITY WITH BMI OF 45.0-49.9, ADULT (HCC): ICD-10-CM

## 2021-06-09 DIAGNOSIS — G47.33 OBSTRUCTIVE SLEEP APNEA: Primary | ICD-10-CM

## 2021-06-09 DIAGNOSIS — G47.09 OTHER INSOMNIA: ICD-10-CM

## 2021-06-09 DIAGNOSIS — G47.10 HYPERSOMNIA: ICD-10-CM

## 2021-06-09 PROCEDURE — 1036F TOBACCO NON-USER: CPT | Performed by: PHYSICIAN ASSISTANT

## 2021-06-09 PROCEDURE — 99214 OFFICE O/P EST MOD 30 MIN: CPT | Performed by: PHYSICIAN ASSISTANT

## 2021-06-09 PROCEDURE — G8417 CALC BMI ABV UP PARAM F/U: HCPCS | Performed by: PHYSICIAN ASSISTANT

## 2021-06-09 PROCEDURE — 3017F COLORECTAL CA SCREEN DOC REV: CPT | Performed by: PHYSICIAN ASSISTANT

## 2021-06-09 PROCEDURE — G8427 DOCREV CUR MEDS BY ELIG CLIN: HCPCS | Performed by: PHYSICIAN ASSISTANT

## 2021-06-09 ASSESSMENT — ENCOUNTER SYMPTOMS
STRIDOR: 0
CHEST TIGHTNESS: 0
EYES NEGATIVE: 1
BACK PAIN: 0
DIARRHEA: 0
NAUSEA: 0
SHORTNESS OF BREATH: 0
ALLERGIC/IMMUNOLOGIC NEGATIVE: 1
COUGH: 0
WHEEZING: 0

## 2021-06-09 NOTE — PROGRESS NOTES
[facility]      Emmie Light         [id number]  6/9/2021   Chief Complaint   Patient presents with    Follow-up     JENNY 3 month sleep follow up with download         Pt of Morena    PAP Download:   Original or initial AHI: 87.2     Date of initial study: 10/28/2008      Compliant  23%     Noncompliant 20 %     PAP Type Airsense 10 autosetLevel  15/20   Avg Hrs/Day 3 hr 38 min  AHI: 13.4   Recorded compliance dates , 5/1/2021  to 5/30/2021   Machine/Mfg:   [x] ResMed    [] Respironics/Dreamstation   Interface:   [] Nasal    [] Nasal pillows   [x] FFM      Provider:      [x] [facility]     []Lyn     [] [employer]    [] Ale Chatterjee    [] [facility]               [] [facility]      [] [employer]    [] [address]      [] Other    ESS:  20  SAQLI: 44    Here is a scan of the most recent download:            Presentation:   Donita Jefferson presents for sleep medicine follow up for obstructive sleep apnea  Since the last visit, Donita Jefferson is struggling with PAP. He was placed on APAP after not wearing PAP for several years. He is states that when the pressure was adjusted, the PAP started getting loud. He tolerated the PAP prior to pressure change. Now too loud. When able to tolerate PAP, he feels better. Equipment issues: The pressure is  acceptable, the mask is acceptable     Sleep issues:  Do you feel better? Yes and No  More rested? Sometimes   Better concentration? yes    Progress History:   Since last visit any new medical issues? No  New ER or hospital visits? No  Any new or changes in medicines? No  Any new sleep medicines? No    Review of Systems -   Review of Systems   Constitutional: Negative for activity change, appetite change, chills and fever. HENT: Negative for congestion and postnasal drip. Dry mouth   Eyes: Negative. Respiratory: Negative for cough, chest tightness, shortness of breath, wheezing and stridor.     Cardiovascular: Negative for chest pain and leg swelling. Gastrointestinal: Negative for diarrhea and nausea. Endocrine: Negative. Genitourinary: Negative. Musculoskeletal: Negative. Negative for arthralgias and back pain. Skin: Negative. Allergic/Immunologic: Negative. Neurological: Negative. Negative for dizziness and light-headedness. Psychiatric/Behavioral: Negative. All other systems reviewed and are negative. Physical Exam:    BMI:  Body mass index is 45.07 kg/m². Wt Readings from Last 3 Encounters:   06/09/21 (!) 341 lb 9.6 oz (154.9 kg)   03/16/21 (!) 343 lb 3.2 oz (155.7 kg)   09/01/20 300 lb (136.1 kg)     Weight stable / unchanged  Vitals: /80 (Site: Right Upper Arm, Position: Sitting, Cuff Size: Large Adult)   Pulse 83   Temp 98 °F (36.7 °C) (Temporal)   Ht 6' 1\" (1.854 m)   Wt (!) 341 lb 9.6 oz (154.9 kg)   SpO2 98% Comment: Room air at rest  BMI 45.07 kg/m²       Physical Exam  Constitutional:       Appearance: He is well-developed. HENT:      Head: Normocephalic and atraumatic. Right Ear: External ear normal.      Left Ear: External ear normal.   Eyes:      Conjunctiva/sclera: Conjunctivae normal.      Pupils: Pupils are equal, round, and reactive to light. Cardiovascular:      Rate and Rhythm: Normal rate and regular rhythm. Heart sounds: Normal heart sounds. Pulmonary:      Effort: Pulmonary effort is normal.      Breath sounds: Normal breath sounds. Musculoskeletal:         General: Normal range of motion. Cervical back: Normal range of motion and neck supple. Skin:     General: Skin is warm and dry. Neurological:      Mental Status: He is alert and oriented to person, place, and time. Psychiatric:         Behavior: Behavior normal.         Thought Content: Thought content normal.         Judgment: Judgment normal.           ASSESSMENT/DIAGNOSIS     Diagnosis Orders   1. Obstructive sleep apnea     2. Hypersomnia     3. Other insomnia     4.  Morbid obesity with BMI of 45.0-49.9, adult [facility])            Plan   Do you need any equipment today? No  - Suspect noise if from leak secondary to gasket missing in water chamber  - Will adjust pressure to improve AHI  - Suspect may need auto bipap  - Download reviewed and discussed with patient  - He  was advised to continue current positive airway pressure therapy with above described pressure. - He  advised to keep good compliance with current recommended pressure to get optimal results and clinical improvement  - Recommend 7-9 hours of sleep with PAP  - He was advised to call [facility] regarding supplies if needed.   -He call my office for earlier appointment if needed for worsening of sleep symptoms.   - He was instructed on weight loss  - Michelle Ordoñez was educated about my impression and plan. Patient verbalizesunderstanding.   We will see Alexa Urena back in: 3 months with download    Information added by my medical assistant/LPN was reviewed today          Parish Ritchie PA-C, MPAS  6/9/2021

## 2021-08-10 ENCOUNTER — TELEPHONE (OUTPATIENT)
Dept: PULMONOLOGY | Age: 56
End: 2021-08-10

## 2021-08-10 NOTE — TELEPHONE ENCOUNTER
Patient returned CPAP AMA, I left a message for him to call to reschedule appt if he wants to discuss it.

## 2021-09-21 ENCOUNTER — OFFICE VISIT (OUTPATIENT)
Dept: PULMONOLOGY | Age: 56
End: 2021-09-21
Payer: COMMERCIAL

## 2021-09-21 VITALS
DIASTOLIC BLOOD PRESSURE: 82 MMHG | HEIGHT: 73 IN | HEART RATE: 89 BPM | OXYGEN SATURATION: 96 % | SYSTOLIC BLOOD PRESSURE: 130 MMHG | TEMPERATURE: 97.6 F | WEIGHT: 315 LBS | BODY MASS INDEX: 41.75 KG/M2

## 2021-09-21 DIAGNOSIS — E66.01 MORBID OBESITY WITH BMI OF 45.0-49.9, ADULT (HCC): ICD-10-CM

## 2021-09-21 DIAGNOSIS — R06.83 SNORING: ICD-10-CM

## 2021-09-21 DIAGNOSIS — G47.10 HYPERSOMNIA: ICD-10-CM

## 2021-09-21 DIAGNOSIS — G47.09 OTHER INSOMNIA: ICD-10-CM

## 2021-09-21 DIAGNOSIS — G47.33 OBSTRUCTIVE SLEEP APNEA: Primary | ICD-10-CM

## 2021-09-21 PROCEDURE — G8417 CALC BMI ABV UP PARAM F/U: HCPCS | Performed by: PHYSICIAN ASSISTANT

## 2021-09-21 PROCEDURE — G8427 DOCREV CUR MEDS BY ELIG CLIN: HCPCS | Performed by: PHYSICIAN ASSISTANT

## 2021-09-21 PROCEDURE — 99214 OFFICE O/P EST MOD 30 MIN: CPT | Performed by: PHYSICIAN ASSISTANT

## 2021-09-21 PROCEDURE — 1036F TOBACCO NON-USER: CPT | Performed by: PHYSICIAN ASSISTANT

## 2021-09-21 PROCEDURE — 3017F COLORECTAL CA SCREEN DOC REV: CPT | Performed by: PHYSICIAN ASSISTANT

## 2021-09-21 ASSESSMENT — ENCOUNTER SYMPTOMS
STRIDOR: 0
NAUSEA: 0
DIARRHEA: 0
WHEEZING: 0
ALLERGIC/IMMUNOLOGIC NEGATIVE: 1
CHEST TIGHTNESS: 0
EYES NEGATIVE: 1
COUGH: 0
SHORTNESS OF BREATH: 0
BACK PAIN: 0

## 2021-09-21 NOTE — PROGRESS NOTES
[facility]      Valentino Don         [id number]  9/21/2021   Chief Complaint   Patient presents with    Follow-up     JENNY 3 month with no download, patient returned AMA          Pt of Joshua Reneeradu    PAP Download:   Original or initial AHI: 87.2     Date of initial study: 10/28/2008        Neck Size: 18.75  Mallampati Mallampati 4  ESS:  20  SAQLI: 34    Here is a scan of the most recent download:            Presentation:   Remberto Cook presents for sleep medicine follow up for obstructive sleep apnea  Since the last visit, Remberto Cook turned his CPAP in [facility]. He was struggling with dry mouth and failed to notify our office or DME. He started wearing his old PAP at pressure of 18 and is doing better the past few days. No dry mouth with old PAP. Using same mask. Download from new [employer] shows his AHI was poorly controlled. No download available of old [employer] which he is currently using. He also has been adjusting the pressure himself. Equipment issues: The pressure is  acceptable, the mask is acceptable     Sleep issues:  Do you feel better? Yes and No  More rested? Sometimes   Better concentration? yes    Progress History:   Since last visit any new medical issues? No  New ER or hospital visits? No  Any new or changes in medicines? No  Any new sleep medicines? No    Review of Systems -   Review of Systems   Constitutional: Negative for activity change, appetite change, chills and fever. HENT: Negative for congestion and postnasal drip. Eyes: Negative. Respiratory: Negative for cough, chest tightness, shortness of breath, wheezing and stridor. Cardiovascular: Negative for chest pain and leg swelling. Gastrointestinal: Negative for diarrhea and nausea. Endocrine: Negative. Genitourinary: Negative. Musculoskeletal: Negative. Negative for arthralgias and back pain. Skin: Negative. Allergic/Immunologic: Negative. Neurological: Negative.   Negative for dizziness and light-headedness. Psychiatric/Behavioral: Negative. All other systems reviewed and are negative. Physical Exam:    BMI:  Body mass index is 46.31 kg/m². Wt Readings from Last 3 Encounters:   09/21/21 (!) 351 lb (159.2 kg)   06/09/21 (!) 341 lb 9.6 oz (154.9 kg)   03/16/21 (!) 343 lb 3.2 oz (155.7 kg)     Weight gained 10 lbs over 3 months  Vitals: /82 (Site: Left Upper Arm, Position: Sitting, Cuff Size: Large Adult)   Pulse 89   Temp 97.6 °F (36.4 °C) (Temporal)   Ht 6' 1\" (1.854 m)   Wt (!) 351 lb (159.2 kg)   SpO2 96%   BMI 46.31 kg/m²       Physical Exam  Constitutional:       Appearance: He is well-developed. HENT:      Head: Normocephalic and atraumatic. Right Ear: External ear normal.      Left Ear: External ear normal.   Eyes:      Conjunctiva/sclera: Conjunctivae normal.      Pupils: Pupils are equal, round, and reactive to light. Cardiovascular:      Rate and Rhythm: Normal rate and regular rhythm. Heart sounds: Normal heart sounds. Pulmonary:      Effort: Pulmonary effort is normal.      Breath sounds: Normal breath sounds. Musculoskeletal:         General: Normal range of motion. Cervical back: Normal range of motion and neck supple. Skin:     General: Skin is warm and dry. Neurological:      Mental Status: He is alert and oriented to person, place, and time. Psychiatric:         Behavior: Behavior normal.         Thought Content: Thought content normal.         Judgment: Judgment normal.           ASSESSMENT/DIAGNOSIS     Diagnosis Orders   1. Obstructive sleep apnea     2. Hypersomnia     3. Other insomnia     4. Morbid obesity with BMI of 45.0-49.9, adult ([address]     5. Snoring            Plan   Do you need any equipment today?  No  - Need download off old PAP to see what his AHI is   - may adjust from there or may need bipap  - Download reviewed and discussed with patient  - He  was advised to continue current positive airway pressure therapy with above described pressure. - He  advised to keep good compliance with current recommended pressure to get optimal results and clinical improvement  - Recommend 7-9 hours of sleep with PAP  - He was advised to call [employer] company regarding supplies if needed.   -He call my office for earlier appointment if needed for worsening of sleep symptoms.   - He was instructed on weight loss  - Hollie Deluna was educated about my impression and plan. Patient verbalizesunderstanding.   We will see Gala Teresa back pending download    Information added by my medical assistant/LPN was reviewed today       Madan Stanton PA-C, MPAS  9/21/2021

## 2021-12-09 ENCOUNTER — HOSPITAL ENCOUNTER (OUTPATIENT)
Dept: ULTRASOUND IMAGING | Age: 56
Discharge: HOME | End: 2021-12-09
Payer: COMMERCIAL

## 2021-12-09 DIAGNOSIS — N50.89 CHYLOCELE OF TUNICA VAGINALIS: ICD-10-CM

## 2021-12-09 PROCEDURE — 76870 US EXAM SCROTUM: CPT

## 2022-06-04 ENCOUNTER — HOSPITAL ENCOUNTER (EMERGENCY)
Age: 57
Discharge: HOME | End: 2022-06-04
Payer: COMMERCIAL

## 2022-06-04 VITALS
HEART RATE: 84 BPM | BODY MASS INDEX: 38.43 KG/M2 | SYSTOLIC BLOOD PRESSURE: 126 MMHG | HEIGHT: 73 IN | OXYGEN SATURATION: 96 % | RESPIRATION RATE: 16 BRPM | DIASTOLIC BLOOD PRESSURE: 80 MMHG | WEIGHT: 290 LBS | TEMPERATURE: 97.9 F

## 2022-06-04 DIAGNOSIS — S65.514A LACERATION OF BLOOD VESSEL OF RIGHT RING FINGER, INITIAL ENCOUNTER: Primary | ICD-10-CM

## 2022-06-04 PROCEDURE — 99213 OFFICE O/P EST LOW 20 MIN: CPT

## 2022-06-04 PROCEDURE — 99213 OFFICE O/P EST LOW 20 MIN: CPT | Performed by: EMERGENCY MEDICINE

## 2022-06-04 ASSESSMENT — PAIN DESCRIPTION - LOCATION: LOCATION: FINGER (COMMENT WHICH ONE)

## 2022-06-04 ASSESSMENT — PAIN DESCRIPTION - ORIENTATION: ORIENTATION: RIGHT

## 2022-06-04 ASSESSMENT — PAIN DESCRIPTION - PAIN TYPE: TYPE: ACUTE PAIN

## 2022-06-04 ASSESSMENT — PAIN - FUNCTIONAL ASSESSMENT: PAIN_FUNCTIONAL_ASSESSMENT: 0-10

## 2022-06-04 ASSESSMENT — PAIN DESCRIPTION - DESCRIPTORS: DESCRIPTORS: TENDER;THROBBING

## 2022-06-04 ASSESSMENT — ENCOUNTER SYMPTOMS: COUGH: 0

## 2022-06-04 ASSESSMENT — PAIN DESCRIPTION - ONSET: ONSET: SUDDEN

## 2022-06-04 NOTE — ED PROVIDER NOTES
[facility]  Urgent Care Encounter       CHIEF COMPLAINT       Chief Complaint   Patient presents with    Finger Pain     right 4th digit       Nurses Notes reviewed and I agree except as noted in the HPI. HISTORY OF PRESENT ILLNESS   Sanchez Garcia is a 56 y.o. male who presents for complaints of laceration to the right fourth finger. States he cut the finger 4 days ago. He was going out of town so he cover the area with antibiotic ointment and a bandage. He states the edges of the wound keep  when he flexes or extends his fingers. There is no bleeding and it seems to be healing but he wants to have it evaluated and see if can be sutured/glued. HPI    REVIEW OF SYSTEMS     Review of Systems   Constitutional: Negative for chills, fatigue and fever. Respiratory: Negative for cough. Skin: Positive for wound (right fourth finger). PAST MEDICAL HISTORY         Diagnosis Date    Depression     History of hypothyroidism     no meds    Hyperlipidemia     Obesity 4/2/2014    Obstructive sleep apnea 02/13/2015    cpap    Umbilical hernia [id number]       SURGICALHISTORY     Patient  has a past surgical history that includes Anterior cruciate ligament repair (Right, 2004); Vasectomy (2012); Cystocopy (Right, 06/07/2017); Colonoscopy (2018); and hernia repair (Left, 2/26/2020). CURRENT MEDICATIONS       Previous Medications    CPAP MACHINE MISC    by Does not apply route Please change CPAP pressure to auto 17-20 cm H20. Download in 2 weeks    EZETIMIBE-SIMVASTATIN (VYTORIN) 10-40 MG PER TABLET    Take 1 tablet by mouth daily Indications: 20 mg     PHENTERMINE (ADIPEX-P) 37.5 MG TABLET           ALLERGIES     Patient is has No Known Allergies.     Patients   Immunization History   Administered Date(s) Administered    COVID-19, J&J, PF, 0.5 mL 05/10/2021, 12/06/2021       FAMILY HISTORY     Patient's family history includes Alzheimer's Disease in his paternal grandfather and paternal grandmother; Anemia in his mother; Emphysema in his maternal grandmother; No Known Problems in his father and maternal grandfather. SOCIAL HISTORY     Patient  reports that he has never smoked. He has never used smokeless tobacco. He reports current alcohol use. He reports that he does not use drugs. PHYSICAL EXAM     ED TRIAGE VITALS  BP: 126/80, Temp: 97.9 °F (36.6 °C), Heart Rate: 84, Resp: 16, SpO2: 96 %,Estimated body mass index is 38.26 kg/m² as calculated from the following:    Height as of this encounter: 6' 1\" (1.854 m). Weight as of this encounter: 290 lb (131.5 kg). ,No LMP for male patient. Physical Exam  Constitutional:       General: He is not in acute distress. Appearance: He is normal weight. He is not ill-appearing. Cardiovascular:      Rate and Rhythm: Normal rate. Pulmonary:      Effort: Pulmonary effort is normal.   Musculoskeletal:      Right hand: Laceration present. Hands:    Skin:     General: Skin is warm and dry. Capillary Refill: Capillary refill takes less than 2 seconds. Neurological:      General: No focal deficit present. Mental Status: He is alert. DIAGNOSTIC RESULTS     Labs:No results found for this visit on 06/04/22. IMAGING:    No orders to display         EKG:      URGENT CARE COURSE:     Vitals:    06/04/22 0832   BP: 126/80   Pulse: 84   Resp: 16   Temp: 97.9 °F (36.6 °C)   TempSrc: Temporal   SpO2: 96%   Weight: 290 lb (131.5 kg)   Height: 6' 1\" (1.854 m)       Medications - No data to display         PROCEDURES:  None    FINAL IMPRESSION      1. Laceration of blood vessel of right ring finger, initial encounter          DISPOSITION/ PLAN     Patient presents for laceration of the right ring finger is 3days old. This will likely have to heal by secondary intention. There are no signs of infection. I did try to approximate the wound edges and applied tissue adhesive but edges would not approximate. Area was covered with bandage again. Can be discharged home and advised to continue antibiotic ointment, he can keep the wound open to air during the day when he is not active but cover when he is active and may injure the fingertip or if it will be submerged or become dirty. Return for increased redness, swelling, drainage or new concerns.       PATIENT REFERRED TO:  Angely Bear MD  [address] [facility] / [address] [place] [id number]      DISCHARGE MEDICATIONS:  New Prescriptions    No medications on file       Discontinued Medications    No medications on file       Current Discharge Medication List          DELROY Horner CNP    (Please note that portions of this note were completed with a voice recognition program. Efforts were made to edit the dictations but occasionally words are mis-transcribed.)          DELROY Horner CNP  06/04/22 8308

## (undated) DEVICE — GARMENT,MEDLINE,DVT,INT,CALF,LG, GEN2: Brand: MEDLINE

## (undated) DEVICE — SOLUTION ANTIFOG VIS SYS CLEARIFY LAPSCP

## (undated) DEVICE — [HIGH FLOW INSUFFLATOR,  DO NOT USE IF PACKAGE IS DAMAGED,  KEEP DRY,  KEEP AWAY FROM SUNLIGHT,  PROTECT FROM HEAT AND RADIOACTIVE SOURCES.]: Brand: PNEUMOSURE

## (undated) DEVICE — TROCAR ENDOSCP L150MM DIA12MM BLDELSS OBT RADLUC STBL SL

## (undated) DEVICE — GLOVE SURG SZ 75 L12IN FNGR THK94MIL STD WHT ISOLEX LTX

## (undated) DEVICE — GLOVE SURG SZ 7 L12IN FNGR THK79MIL GRN LTX FREE

## (undated) DEVICE — BANDAGE ADH W1XL3IN NAT FAB WVN FLX DURABLE N ADH PD SEAL

## (undated) DEVICE — BLADE LARYNSCP SZ 4 ENH DIR INTUB GLIDESCOPE MCGRATH MAC

## (undated) DEVICE — GOWN,SIRUS,NONRNF,SETINSLV,XL,20/CS: Brand: MEDLINE

## (undated) DEVICE — TUBING, SUCTION, 1/4" X 12', STRAIGHT: Brand: MEDLINE

## (undated) DEVICE — ELECTRO LUBE IS A SINGLE PATIENT USE DEVICE THAT IS INTENDED TO BE USED ON ELECTROSURGICAL ELECTRODES TO REDUCE STICKING.: Brand: KEY SURGICAL ELECTRO LUBE

## (undated) DEVICE — TIP COVER ACCESSORY

## (undated) DEVICE — GLOVE SURG SZ 65 THK91MIL LTX FREE SYN POLYISOPRENE

## (undated) DEVICE — GLOVE ORANGE PI 7 1/2   MSG9075

## (undated) DEVICE — BLADE CLIPPER GEN PURP NS

## (undated) DEVICE — GOWN,SIRUS,NON REINFRCD,LARGE,SET IN SL: Brand: MEDLINE

## (undated) DEVICE — PENCIL ES L3M BTTN SWCH HOLSTER W/ BLDE ELECTRD EDGE

## (undated) DEVICE — CLIPVAC PRESURG HAIR REMOVAL

## (undated) DEVICE — KIT DRP 3 ARM ACC DISP ENDOWRIST DA VINCI SI

## (undated) DEVICE — SET GRAV VENT NVENT CK VLV 3 NDL FREE PRT 10 GTT

## (undated) DEVICE — GENERAL LAPAROSCOPY PACK-LF: Brand: MEDLINE INDUSTRIES, INC.

## (undated) DEVICE — SOLUTION IV 1000ML 0.9% SOD CHL PH 5 INJ USP VIAFLX PLAS

## (undated) DEVICE — TUBING IV STOPCOCK 48 CM 3 W

## (undated) DEVICE — BLADELESS OBTURATOR: Brand: WECK VISTA